# Patient Record
Sex: FEMALE | Race: WHITE | Employment: OTHER | URBAN - METROPOLITAN AREA
[De-identification: names, ages, dates, MRNs, and addresses within clinical notes are randomized per-mention and may not be internally consistent; named-entity substitution may affect disease eponyms.]

---

## 2017-01-09 ENCOUNTER — ANTI-COAG VISIT (OUTPATIENT)
Dept: INTERNAL MEDICINE CLINIC | Facility: CLINIC | Age: 82
End: 2017-01-09

## 2017-01-09 DIAGNOSIS — I48.20 CHRONIC ATRIAL FIBRILLATION (HCC): ICD-10-CM

## 2017-01-09 DIAGNOSIS — Z79.01 LONG TERM (CURRENT) USE OF ANTICOAGULANTS: ICD-10-CM

## 2017-01-09 DIAGNOSIS — I48.91 ATRIAL FIBRILLATION, UNSPECIFIED TYPE (HCC): Primary | ICD-10-CM

## 2017-01-09 LAB — INR: 2.2 (ref 2–3)

## 2017-01-23 ENCOUNTER — ANTI-COAG VISIT (OUTPATIENT)
Dept: INTERNAL MEDICINE CLINIC | Facility: CLINIC | Age: 82
End: 2017-01-23

## 2017-01-23 DIAGNOSIS — I48.20 CHRONIC ATRIAL FIBRILLATION (HCC): ICD-10-CM

## 2017-01-23 DIAGNOSIS — Z79.01 LONG TERM (CURRENT) USE OF ANTICOAGULANTS: ICD-10-CM

## 2017-01-23 DIAGNOSIS — I48.91 ATRIAL FIBRILLATION, UNSPECIFIED TYPE (HCC): Primary | ICD-10-CM

## 2017-01-23 LAB — INR: 2.1 (ref 2–3)

## 2017-02-06 ENCOUNTER — ANTI-COAG VISIT (OUTPATIENT)
Dept: INTERNAL MEDICINE CLINIC | Facility: CLINIC | Age: 82
End: 2017-02-06

## 2017-02-06 DIAGNOSIS — I48.91 ATRIAL FIBRILLATION, UNSPECIFIED TYPE (HCC): Primary | ICD-10-CM

## 2017-02-06 DIAGNOSIS — Z79.01 LONG TERM (CURRENT) USE OF ANTICOAGULANTS: ICD-10-CM

## 2017-02-06 DIAGNOSIS — I48.20 CHRONIC ATRIAL FIBRILLATION (HCC): ICD-10-CM

## 2017-02-06 LAB — INR: 2 (ref 2–3)

## 2017-02-21 ENCOUNTER — ANTI-COAG VISIT (OUTPATIENT)
Dept: INTERNAL MEDICINE CLINIC | Facility: CLINIC | Age: 82
End: 2017-02-21

## 2017-02-21 ENCOUNTER — TELEPHONE (OUTPATIENT)
Dept: INTERNAL MEDICINE CLINIC | Facility: CLINIC | Age: 82
End: 2017-02-21

## 2017-02-21 DIAGNOSIS — I48.20 CHRONIC ATRIAL FIBRILLATION (HCC): Primary | ICD-10-CM

## 2017-02-21 DIAGNOSIS — Z79.01 LONG TERM (CURRENT) USE OF ANTICOAGULANTS: ICD-10-CM

## 2017-02-21 DIAGNOSIS — I48.91 ATRIAL FIBRILLATION, UNSPECIFIED TYPE (HCC): Primary | ICD-10-CM

## 2017-02-21 DIAGNOSIS — I48.20 CHRONIC ATRIAL FIBRILLATION (HCC): ICD-10-CM

## 2017-02-21 LAB — INR: 1.9 (ref 2–3)

## 2017-02-22 ENCOUNTER — ANTI-COAG VISIT (OUTPATIENT)
Dept: INTERNAL MEDICINE CLINIC | Facility: CLINIC | Age: 82
End: 2017-02-22

## 2017-02-22 ENCOUNTER — TELEPHONE (OUTPATIENT)
Dept: INTERNAL MEDICINE CLINIC | Facility: CLINIC | Age: 82
End: 2017-02-22

## 2017-02-22 DIAGNOSIS — I48.91 ATRIAL FIBRILLATION, UNSPECIFIED TYPE (HCC): ICD-10-CM

## 2017-02-22 DIAGNOSIS — Z79.01 LONG TERM (CURRENT) USE OF ANTICOAGULANTS: ICD-10-CM

## 2017-02-22 DIAGNOSIS — I48.20 CHRONIC ATRIAL FIBRILLATION (HCC): Primary | ICD-10-CM

## 2017-03-07 ENCOUNTER — TELEPHONE (OUTPATIENT)
Dept: CARDIOLOGY CLINIC | Facility: CLINIC | Age: 82
End: 2017-03-07

## 2017-03-07 RX ORDER — DILTIAZEM HYDROCHLORIDE 120 MG/1
120 CAPSULE, COATED, EXTENDED RELEASE ORAL
Qty: 30 CAPSULE | Refills: 1 | Status: SHIPPED | OUTPATIENT
Start: 2017-03-07 | End: 2017-03-08

## 2017-03-08 LAB — INR: 1.9 (ref 2–3)

## 2017-03-08 RX ORDER — DILTIAZEM HYDROCHLORIDE 120 MG/1
120 CAPSULE, COATED, EXTENDED RELEASE ORAL
Qty: 90 CAPSULE | Refills: 0 | Status: SHIPPED | OUTPATIENT
Start: 2017-03-08 | End: 2017-05-30

## 2017-03-10 ENCOUNTER — ANTI-COAG VISIT (OUTPATIENT)
Dept: INTERNAL MEDICINE CLINIC | Facility: CLINIC | Age: 82
End: 2017-03-10

## 2017-03-10 DIAGNOSIS — Z79.01 LONG TERM (CURRENT) USE OF ANTICOAGULANTS: ICD-10-CM

## 2017-03-10 DIAGNOSIS — I48.91 ATRIAL FIBRILLATION, UNSPECIFIED TYPE (HCC): ICD-10-CM

## 2017-03-10 DIAGNOSIS — I48.20 CHRONIC ATRIAL FIBRILLATION (HCC): Primary | ICD-10-CM

## 2017-03-20 ENCOUNTER — ANTI-COAG VISIT (OUTPATIENT)
Dept: INTERNAL MEDICINE CLINIC | Facility: CLINIC | Age: 82
End: 2017-03-20

## 2017-03-20 DIAGNOSIS — I48.20 CHRONIC ATRIAL FIBRILLATION (HCC): Primary | ICD-10-CM

## 2017-03-20 DIAGNOSIS — Z79.01 LONG TERM (CURRENT) USE OF ANTICOAGULANTS: ICD-10-CM

## 2017-03-20 DIAGNOSIS — I48.91 ATRIAL FIBRILLATION, UNSPECIFIED TYPE (HCC): ICD-10-CM

## 2017-03-20 LAB — INR: 1.8 (ref 2–3)

## 2017-03-21 ENCOUNTER — PRIOR ORIGINAL RECORDS (OUTPATIENT)
Dept: OTHER | Age: 82
End: 2017-03-21

## 2017-03-30 ENCOUNTER — ANTI-COAG VISIT (OUTPATIENT)
Dept: INTERNAL MEDICINE CLINIC | Facility: CLINIC | Age: 82
End: 2017-03-30

## 2017-03-30 DIAGNOSIS — Z79.01 LONG TERM (CURRENT) USE OF ANTICOAGULANTS: ICD-10-CM

## 2017-03-30 DIAGNOSIS — I48.91 ATRIAL FIBRILLATION, UNSPECIFIED TYPE (HCC): ICD-10-CM

## 2017-03-30 DIAGNOSIS — I48.20 CHRONIC ATRIAL FIBRILLATION (HCC): Primary | ICD-10-CM

## 2017-03-30 LAB — INR: 2.2 (ref 2–3)

## 2017-04-17 ENCOUNTER — ANTI-COAG VISIT (OUTPATIENT)
Dept: INTERNAL MEDICINE CLINIC | Facility: CLINIC | Age: 82
End: 2017-04-17

## 2017-04-17 DIAGNOSIS — I48.91 ATRIAL FIBRILLATION, UNSPECIFIED TYPE (HCC): ICD-10-CM

## 2017-04-17 DIAGNOSIS — Z79.01 LONG TERM (CURRENT) USE OF ANTICOAGULANTS: ICD-10-CM

## 2017-04-17 DIAGNOSIS — I48.20 CHRONIC ATRIAL FIBRILLATION (HCC): Primary | ICD-10-CM

## 2017-04-19 ENCOUNTER — TELEPHONE (OUTPATIENT)
Dept: CARDIOLOGY CLINIC | Facility: CLINIC | Age: 82
End: 2017-04-19

## 2017-04-19 RX ORDER — WARFARIN SODIUM 1 MG/1
1 TABLET ORAL EVERY EVENING
Qty: 90 TABLET | Refills: 0 | Status: SHIPPED | OUTPATIENT
Start: 2017-04-19 | End: 2017-08-04 | Stop reason: SDUPTHER

## 2017-04-19 NOTE — TELEPHONE ENCOUNTER
Warfarin Sod 1mg tab (pink), take 1 tab (1mg) by mouth daily as directed by the CC. Per [facility] \"pt is requesting a 90 day supply. Current rx only has enough for 30 days\".      Current outpatient prescriptions:   •  Warfarin Sodium 1 MG Oral Tab, Take

## 2017-04-24 ENCOUNTER — ANTI-COAG VISIT (OUTPATIENT)
Dept: INTERNAL MEDICINE CLINIC | Facility: CLINIC | Age: 82
End: 2017-04-24

## 2017-04-24 DIAGNOSIS — I48.20 CHRONIC ATRIAL FIBRILLATION (HCC): Primary | ICD-10-CM

## 2017-04-24 DIAGNOSIS — Z79.01 LONG TERM (CURRENT) USE OF ANTICOAGULANTS: ICD-10-CM

## 2017-04-24 DIAGNOSIS — I48.91 ATRIAL FIBRILLATION, UNSPECIFIED TYPE (HCC): ICD-10-CM

## 2017-05-08 ENCOUNTER — ANTI-COAG VISIT (OUTPATIENT)
Dept: INTERNAL MEDICINE CLINIC | Facility: CLINIC | Age: 82
End: 2017-05-08

## 2017-05-08 DIAGNOSIS — I48.20 CHRONIC ATRIAL FIBRILLATION (HCC): Primary | ICD-10-CM

## 2017-05-08 DIAGNOSIS — Z79.01 LONG TERM (CURRENT) USE OF ANTICOAGULANTS: ICD-10-CM

## 2017-05-08 DIAGNOSIS — I48.91 ATRIAL FIBRILLATION, UNSPECIFIED TYPE (HCC): ICD-10-CM

## 2017-05-22 ENCOUNTER — ANTI-COAG VISIT (OUTPATIENT)
Dept: INTERNAL MEDICINE CLINIC | Facility: CLINIC | Age: 82
End: 2017-05-22

## 2017-05-22 DIAGNOSIS — Z79.01 LONG TERM (CURRENT) USE OF ANTICOAGULANTS: ICD-10-CM

## 2017-05-22 DIAGNOSIS — I48.91 ATRIAL FIBRILLATION, UNSPECIFIED TYPE (HCC): ICD-10-CM

## 2017-05-22 DIAGNOSIS — I48.20 CHRONIC ATRIAL FIBRILLATION (HCC): Primary | ICD-10-CM

## 2017-05-30 ENCOUNTER — TELEPHONE (OUTPATIENT)
Dept: CARDIOLOGY CLINIC | Facility: CLINIC | Age: 82
End: 2017-05-30

## 2017-05-30 RX ORDER — DILTIAZEM HYDROCHLORIDE 120 MG/1
120 CAPSULE, COATED, EXTENDED RELEASE ORAL
Qty: 30 CAPSULE | Refills: 0 | Status: SHIPPED | OUTPATIENT
Start: 2017-05-30 | End: 2017-06-22

## 2017-05-30 NOTE — TELEPHONE ENCOUNTER
Cartia (Diltiazem) 120MG XT caps, take 1 capsule (120mg) by mouth every day, qty 90    Current outpatient prescriptions:   •  DilTIAZem HCl ER Coated Beads (CARTIA XT) 120 MG Oral Capsule SR 24 Hr, Take 1 capsule (120 mg total) by mouth once daily. , Disp:

## 2017-05-30 NOTE — TELEPHONE ENCOUNTER
Received fax from [facility] requesting 90 day supply of Cartia (Diltiazem) 120Mmg XT caps

## 2017-05-30 NOTE — TELEPHONE ENCOUNTER
Called [facility] pharmacy on file, denying 90 day supply of medication, only authorizing 30 days.

## 2017-06-06 ENCOUNTER — ANTI-COAG VISIT (OUTPATIENT)
Dept: INTERNAL MEDICINE CLINIC | Facility: CLINIC | Age: 82
End: 2017-06-06

## 2017-06-06 DIAGNOSIS — I48.91 ATRIAL FIBRILLATION, UNSPECIFIED TYPE (HCC): ICD-10-CM

## 2017-06-06 DIAGNOSIS — Z79.01 LONG TERM (CURRENT) USE OF ANTICOAGULANTS: ICD-10-CM

## 2017-06-06 DIAGNOSIS — I48.20 CHRONIC ATRIAL FIBRILLATION (HCC): Primary | ICD-10-CM

## 2017-06-22 ENCOUNTER — TELEPHONE (OUTPATIENT)
Dept: CARDIOLOGY CLINIC | Facility: CLINIC | Age: 82
End: 2017-06-22

## 2017-06-22 ENCOUNTER — ANTI-COAG VISIT (OUTPATIENT)
Dept: INTERNAL MEDICINE CLINIC | Facility: CLINIC | Age: 82
End: 2017-06-22

## 2017-06-22 DIAGNOSIS — I48.20 CHRONIC ATRIAL FIBRILLATION (HCC): Primary | ICD-10-CM

## 2017-06-22 DIAGNOSIS — I48.91 ATRIAL FIBRILLATION, UNSPECIFIED TYPE (HCC): ICD-10-CM

## 2017-06-22 DIAGNOSIS — Z79.01 LONG TERM (CURRENT) USE OF ANTICOAGULANTS: ICD-10-CM

## 2017-06-22 RX ORDER — DILTIAZEM HYDROCHLORIDE 120 MG/1
120 CAPSULE, COATED, EXTENDED RELEASE ORAL
Qty: 30 CAPSULE | Refills: 0 | Status: SHIPPED | OUTPATIENT
Start: 2017-06-22 | End: 2017-08-04

## 2017-06-24 ENCOUNTER — PRIOR ORIGINAL RECORDS (OUTPATIENT)
Dept: OTHER | Age: 82
End: 2017-06-24

## 2017-06-24 ENCOUNTER — LAB ENCOUNTER (OUTPATIENT)
Dept: LAB | Facility: HOSPITAL | Age: 82
End: 2017-06-24
Attending: INTERNAL MEDICINE
Payer: MEDICARE

## 2017-06-24 DIAGNOSIS — I48.91 ATRIAL FIBRILLATION, UNSPECIFIED TYPE (HCC): ICD-10-CM

## 2017-06-24 DIAGNOSIS — I42.9 PRIMARY CARDIOMYOPATHY (HCC): ICD-10-CM

## 2017-06-24 DIAGNOSIS — I10 HYPERTENSION, BENIGN: ICD-10-CM

## 2017-06-24 DIAGNOSIS — I48.91 ATRIAL FIBRILLATION (HCC): Primary | ICD-10-CM

## 2017-06-24 DIAGNOSIS — I48.20 CHRONIC ATRIAL FIBRILLATION (HCC): ICD-10-CM

## 2017-06-24 DIAGNOSIS — Z79.01 LONG TERM (CURRENT) USE OF ANTICOAGULANTS: ICD-10-CM

## 2017-06-24 PROCEDURE — 84450 TRANSFERASE (AST) (SGOT): CPT

## 2017-06-24 PROCEDURE — 36415 COLL VENOUS BLD VENIPUNCTURE: CPT

## 2017-06-24 PROCEDURE — 84460 ALANINE AMINO (ALT) (SGPT): CPT

## 2017-06-24 PROCEDURE — 85610 PROTHROMBIN TIME: CPT

## 2017-06-24 PROCEDURE — 84443 ASSAY THYROID STIM HORMONE: CPT

## 2017-06-25 NOTE — PROGRESS NOTES
Your Prothrombin time/INR  is in therapeutic range. Continue coumadin dosing as directed by the coumadin clinic.

## 2017-06-26 ENCOUNTER — ANTI-COAG VISIT (OUTPATIENT)
Dept: INTERNAL MEDICINE CLINIC | Facility: CLINIC | Age: 82
End: 2017-06-26

## 2017-06-26 DIAGNOSIS — I48.91 ATRIAL FIBRILLATION, UNSPECIFIED TYPE (HCC): ICD-10-CM

## 2017-06-26 DIAGNOSIS — Z79.01 LONG TERM (CURRENT) USE OF ANTICOAGULANTS: ICD-10-CM

## 2017-06-26 DIAGNOSIS — I48.20 CHRONIC ATRIAL FIBRILLATION (HCC): ICD-10-CM

## 2017-06-26 LAB — THYROID STIMULATING HORMONE: 5.38 MLU/L

## 2017-06-27 ENCOUNTER — PRIOR ORIGINAL RECORDS (OUTPATIENT)
Dept: OTHER | Age: 82
End: 2017-06-27

## 2017-06-27 LAB
SGOT (AST): 36 IU/L
SGPT (ALT): 22 IU/L

## 2017-07-11 LAB — INR: 2.7 (ref 2–3)

## 2017-07-13 ENCOUNTER — ANTI-COAG VISIT (OUTPATIENT)
Dept: INTERNAL MEDICINE CLINIC | Facility: CLINIC | Age: 82
End: 2017-07-13

## 2017-07-13 DIAGNOSIS — I48.91 ATRIAL FIBRILLATION, UNSPECIFIED TYPE (HCC): ICD-10-CM

## 2017-07-13 DIAGNOSIS — Z79.01 LONG TERM (CURRENT) USE OF ANTICOAGULANTS: ICD-10-CM

## 2017-07-13 DIAGNOSIS — I48.20 CHRONIC ATRIAL FIBRILLATION (HCC): ICD-10-CM

## 2017-07-24 ENCOUNTER — ANTI-COAG VISIT (OUTPATIENT)
Dept: INTERNAL MEDICINE CLINIC | Facility: CLINIC | Age: 82
End: 2017-07-24

## 2017-07-24 DIAGNOSIS — Z79.01 LONG TERM (CURRENT) USE OF ANTICOAGULANTS: ICD-10-CM

## 2017-07-24 DIAGNOSIS — I48.91 ATRIAL FIBRILLATION, UNSPECIFIED TYPE (HCC): ICD-10-CM

## 2017-07-24 DIAGNOSIS — I48.20 CHRONIC ATRIAL FIBRILLATION (HCC): ICD-10-CM

## 2017-07-24 LAB — INR: 2.1 (ref 2–3)

## 2017-07-25 ENCOUNTER — OFFICE VISIT (OUTPATIENT)
Dept: CARDIOLOGY CLINIC | Facility: CLINIC | Age: 82
End: 2017-07-25

## 2017-07-25 VITALS
SYSTOLIC BLOOD PRESSURE: 126 MMHG | WEIGHT: 170 LBS | RESPIRATION RATE: 20 BRPM | BODY MASS INDEX: 28 KG/M2 | DIASTOLIC BLOOD PRESSURE: 70 MMHG | HEART RATE: 76 BPM

## 2017-07-25 DIAGNOSIS — I42.9 PRIMARY CARDIOMYOPATHY (HCC): ICD-10-CM

## 2017-07-25 DIAGNOSIS — I10 HYPERTENSION, BENIGN: ICD-10-CM

## 2017-07-25 DIAGNOSIS — I48.20 CHRONIC ATRIAL FIBRILLATION (HCC): Primary | ICD-10-CM

## 2017-07-25 PROCEDURE — 99214 OFFICE O/P EST MOD 30 MIN: CPT | Performed by: INTERNAL MEDICINE

## 2017-07-25 PROCEDURE — G0463 HOSPITAL OUTPT CLINIC VISIT: HCPCS | Performed by: INTERNAL MEDICINE

## 2017-07-25 NOTE — PROGRESS NOTES
Tamar Ingram is a 80year old female. Patient presents with: Follow - Up: Both feet swollen    HPI:   This is a pleasant 80year-old with chronic atrial fibrillation elevated cholesterol hypertension and prior cardiomyopathy with some MR.   She presents medical history on file.    Social History:  Smoking status: Never Smoker                                                                   REVIEW OF SYSTEMS:   GENERAL HEALTH: feels well otherwise  SKIN: denies any unusual skin lesions or rashes  RESPIRATO

## 2017-07-25 NOTE — PATIENT INSTRUCTIONS
Continue same medicines and call if leg swelling worsens  To help with leg swelling avoid salt keep feet up when sitting for prolonged periods of time and if it persists consider adding support stockings  Continue Coumadin clinic follow-up  Should have blo

## 2017-08-04 ENCOUNTER — TELEPHONE (OUTPATIENT)
Dept: CARDIOLOGY CLINIC | Facility: CLINIC | Age: 82
End: 2017-08-04

## 2017-08-04 ENCOUNTER — ANTI-COAG VISIT (OUTPATIENT)
Dept: INTERNAL MEDICINE CLINIC | Facility: CLINIC | Age: 82
End: 2017-08-04

## 2017-08-04 DIAGNOSIS — I48.20 CHRONIC ATRIAL FIBRILLATION (HCC): ICD-10-CM

## 2017-08-04 DIAGNOSIS — I48.91 ATRIAL FIBRILLATION, UNSPECIFIED TYPE (HCC): ICD-10-CM

## 2017-08-04 DIAGNOSIS — Z79.01 LONG TERM (CURRENT) USE OF ANTICOAGULANTS: ICD-10-CM

## 2017-08-04 RX ORDER — DILTIAZEM HYDROCHLORIDE 120 MG/1
120 CAPSULE, COATED, EXTENDED RELEASE ORAL
Qty: 30 CAPSULE | Refills: 12 | Status: SHIPPED | OUTPATIENT
Start: 2017-08-04 | End: 2018-08-23

## 2017-08-04 RX ORDER — WARFARIN SODIUM 1 MG/1
TABLET ORAL
Qty: 90 TABLET | Refills: 1 | Status: SHIPPED | OUTPATIENT
Start: 2017-08-04 | End: 2018-01-23

## 2017-08-04 NOTE — TELEPHONE ENCOUNTER
Cartia (Diltiazem) 120MGXT caps, take 1 cap (120mg) by mouth every day, qty 30    Current Outpatient Prescriptions:   •  DilTIAZem HCl ER Coated Beads (CARTIA XT) 120 MG Oral Capsule SR 24 Hr, Take 1 capsule (120 mg total) by mouth once daily. , Disp: 30 ca

## 2017-08-04 NOTE — TELEPHONE ENCOUNTER
Warfarin Sod 1mg tab (pink), take 1 tab (1mg) by mouth every evening, qty 90    Current Outpatient Prescriptions:   •  Warfarin Sodium 1 MG Oral Tab, Take 1 tablet (1 mg total) by mouth every evening., Disp: 90 tablet, Rfl: 0

## 2017-08-07 LAB — INR: 2.3 (ref 2–3)

## 2017-08-09 ENCOUNTER — ANTI-COAG VISIT (OUTPATIENT)
Dept: INTERNAL MEDICINE CLINIC | Facility: CLINIC | Age: 82
End: 2017-08-09

## 2017-08-09 DIAGNOSIS — I48.91 ATRIAL FIBRILLATION, UNSPECIFIED TYPE (HCC): ICD-10-CM

## 2017-08-09 DIAGNOSIS — Z79.01 LONG TERM (CURRENT) USE OF ANTICOAGULANTS: ICD-10-CM

## 2017-08-09 DIAGNOSIS — I48.20 CHRONIC ATRIAL FIBRILLATION (HCC): ICD-10-CM

## 2017-08-22 LAB — INR: 2.2 (ref 2–3)

## 2017-08-23 ENCOUNTER — ANTI-COAG VISIT (OUTPATIENT)
Dept: INTERNAL MEDICINE CLINIC | Facility: CLINIC | Age: 82
End: 2017-08-23

## 2017-08-23 DIAGNOSIS — I48.20 CHRONIC ATRIAL FIBRILLATION (HCC): ICD-10-CM

## 2017-08-23 DIAGNOSIS — I48.91 ATRIAL FIBRILLATION, UNSPECIFIED TYPE (HCC): ICD-10-CM

## 2017-09-04 LAB — INR: 2.2 (ref 2–3)

## 2017-09-08 ENCOUNTER — ANTI-COAG VISIT (OUTPATIENT)
Dept: INTERNAL MEDICINE CLINIC | Facility: CLINIC | Age: 82
End: 2017-09-08

## 2017-09-08 DIAGNOSIS — I48.91 ATRIAL FIBRILLATION, UNSPECIFIED TYPE (HCC): ICD-10-CM

## 2017-09-08 DIAGNOSIS — I48.20 CHRONIC ATRIAL FIBRILLATION (HCC): ICD-10-CM

## 2017-09-08 DIAGNOSIS — I48.0 PAROXYSMAL ATRIAL FIBRILLATION (HCC): ICD-10-CM

## 2017-09-18 ENCOUNTER — ANTI-COAG VISIT (OUTPATIENT)
Dept: INTERNAL MEDICINE CLINIC | Facility: CLINIC | Age: 82
End: 2017-09-18

## 2017-09-18 DIAGNOSIS — I48.91 ATRIAL FIBRILLATION, UNSPECIFIED TYPE (HCC): ICD-10-CM

## 2017-09-18 DIAGNOSIS — I48.0 PAROXYSMAL ATRIAL FIBRILLATION (HCC): ICD-10-CM

## 2017-09-18 DIAGNOSIS — I48.20 CHRONIC ATRIAL FIBRILLATION (HCC): ICD-10-CM

## 2017-09-18 LAB — INR: 2.2 (ref 2–3)

## 2017-10-02 ENCOUNTER — ANTI-COAG VISIT (OUTPATIENT)
Dept: INTERNAL MEDICINE CLINIC | Facility: CLINIC | Age: 82
End: 2017-10-02

## 2017-10-02 DIAGNOSIS — I48.20 CHRONIC ATRIAL FIBRILLATION (HCC): ICD-10-CM

## 2017-10-02 DIAGNOSIS — I48.91 ATRIAL FIBRILLATION, UNSPECIFIED TYPE (HCC): ICD-10-CM

## 2017-10-02 DIAGNOSIS — I48.0 PAROXYSMAL ATRIAL FIBRILLATION (HCC): ICD-10-CM

## 2017-10-18 ENCOUNTER — ANTI-COAG VISIT (OUTPATIENT)
Dept: INTERNAL MEDICINE CLINIC | Facility: CLINIC | Age: 82
End: 2017-10-18

## 2017-10-18 DIAGNOSIS — I48.20 CHRONIC ATRIAL FIBRILLATION (HCC): ICD-10-CM

## 2017-10-18 DIAGNOSIS — I48.91 ATRIAL FIBRILLATION, UNSPECIFIED TYPE (HCC): ICD-10-CM

## 2017-11-02 ENCOUNTER — ANTI-COAG VISIT (OUTPATIENT)
Dept: INTERNAL MEDICINE CLINIC | Facility: CLINIC | Age: 82
End: 2017-11-02

## 2017-11-02 DIAGNOSIS — I48.20 CHRONIC ATRIAL FIBRILLATION (HCC): ICD-10-CM

## 2017-11-02 DIAGNOSIS — I48.91 ATRIAL FIBRILLATION, UNSPECIFIED TYPE (HCC): ICD-10-CM

## 2017-11-13 ENCOUNTER — ANTI-COAG VISIT (OUTPATIENT)
Dept: INTERNAL MEDICINE CLINIC | Facility: CLINIC | Age: 82
End: 2017-11-13

## 2017-11-13 DIAGNOSIS — I48.91 ATRIAL FIBRILLATION, UNSPECIFIED TYPE (HCC): ICD-10-CM

## 2017-11-13 DIAGNOSIS — Z79.01 LONG TERM (CURRENT) USE OF ANTICOAGULANTS: ICD-10-CM

## 2017-11-13 DIAGNOSIS — I48.20 CHRONIC ATRIAL FIBRILLATION (HCC): ICD-10-CM

## 2017-11-24 ENCOUNTER — ANTI-COAG VISIT (OUTPATIENT)
Dept: INTERNAL MEDICINE CLINIC | Facility: CLINIC | Age: 82
End: 2017-11-24

## 2017-11-24 DIAGNOSIS — I48.91 ATRIAL FIBRILLATION, UNSPECIFIED TYPE (HCC): ICD-10-CM

## 2017-11-24 DIAGNOSIS — Z79.01 LONG TERM (CURRENT) USE OF ANTICOAGULANTS: ICD-10-CM

## 2017-11-24 DIAGNOSIS — I48.20 CHRONIC ATRIAL FIBRILLATION (HCC): ICD-10-CM

## 2017-12-05 ENCOUNTER — TELEPHONE (OUTPATIENT)
Dept: OPHTHALMOLOGY | Facility: CLINIC | Age: 82
End: 2017-12-05

## 2017-12-05 NOTE — TELEPHONE ENCOUNTER
pts son called. He apologized for missing todays appt with DENISSE. He reschedule for next available, 2/27/18.

## 2017-12-13 ENCOUNTER — ANTI-COAG VISIT (OUTPATIENT)
Dept: INTERNAL MEDICINE CLINIC | Facility: CLINIC | Age: 82
End: 2017-12-13

## 2017-12-13 DIAGNOSIS — Z79.01 LONG TERM (CURRENT) USE OF ANTICOAGULANTS: ICD-10-CM

## 2017-12-13 DIAGNOSIS — I48.20 CHRONIC ATRIAL FIBRILLATION (HCC): ICD-10-CM

## 2017-12-13 DIAGNOSIS — I48.91 ATRIAL FIBRILLATION, UNSPECIFIED TYPE (HCC): ICD-10-CM

## 2018-01-02 LAB — INR: 3.1 (ref 2–3)

## 2018-01-05 ENCOUNTER — ANTI-COAG VISIT (OUTPATIENT)
Dept: INTERNAL MEDICINE CLINIC | Facility: CLINIC | Age: 83
End: 2018-01-05

## 2018-01-05 DIAGNOSIS — I48.91 ATRIAL FIBRILLATION, UNSPECIFIED TYPE (HCC): ICD-10-CM

## 2018-01-05 DIAGNOSIS — I48.20 CHRONIC ATRIAL FIBRILLATION (HCC): ICD-10-CM

## 2018-01-05 DIAGNOSIS — Z79.01 LONG TERM (CURRENT) USE OF ANTICOAGULANTS: ICD-10-CM

## 2018-01-05 DIAGNOSIS — I48.0 PAROXYSMAL ATRIAL FIBRILLATION (HCC): ICD-10-CM

## 2018-01-16 ENCOUNTER — ANTI-COAG VISIT (OUTPATIENT)
Dept: INTERNAL MEDICINE CLINIC | Facility: CLINIC | Age: 83
End: 2018-01-16

## 2018-01-16 DIAGNOSIS — I48.20 CHRONIC ATRIAL FIBRILLATION (HCC): ICD-10-CM

## 2018-01-16 DIAGNOSIS — I48.91 ATRIAL FIBRILLATION, UNSPECIFIED TYPE (HCC): ICD-10-CM

## 2018-01-16 DIAGNOSIS — Z79.01 LONG TERM (CURRENT) USE OF ANTICOAGULANTS: ICD-10-CM

## 2018-01-16 LAB — INR: 2.7 (ref 2–3)

## 2018-01-23 ENCOUNTER — TELEPHONE (OUTPATIENT)
Dept: CARDIOLOGY CLINIC | Facility: CLINIC | Age: 83
End: 2018-01-23

## 2018-01-23 RX ORDER — WARFARIN SODIUM 1 MG/1
TABLET ORAL
Qty: 90 TABLET | Refills: 0 | Status: ON HOLD | OUTPATIENT
Start: 2018-01-23 | End: 2020-01-01

## 2018-01-23 NOTE — TELEPHONE ENCOUNTER
Pts son calling to f/up on getting refill for Warfarin. He states that the pt. Is almost out of her medication. Pt. Was last seen on 7/25/17, and is supposed to come back in March for f/up.

## 2018-01-29 ENCOUNTER — ANTI-COAG VISIT (OUTPATIENT)
Dept: INTERNAL MEDICINE CLINIC | Facility: CLINIC | Age: 83
End: 2018-01-29

## 2018-01-29 DIAGNOSIS — I48.91 ATRIAL FIBRILLATION, UNSPECIFIED TYPE (HCC): ICD-10-CM

## 2018-01-29 DIAGNOSIS — Z79.01 LONG TERM (CURRENT) USE OF ANTICOAGULANTS: ICD-10-CM

## 2018-01-29 DIAGNOSIS — I48.0 PAROXYSMAL ATRIAL FIBRILLATION (HCC): ICD-10-CM

## 2018-01-29 DIAGNOSIS — I48.20 CHRONIC ATRIAL FIBRILLATION (HCC): ICD-10-CM

## 2018-01-29 LAB — INR: 3 (ref 2–3)

## 2018-02-19 LAB — INR: 2.9 (ref 2–3)

## 2018-02-20 ENCOUNTER — ANTI-COAG VISIT (OUTPATIENT)
Dept: INTERNAL MEDICINE CLINIC | Facility: CLINIC | Age: 83
End: 2018-02-20

## 2018-02-20 DIAGNOSIS — I48.20 CHRONIC ATRIAL FIBRILLATION (HCC): ICD-10-CM

## 2018-02-20 DIAGNOSIS — I48.91 ATRIAL FIBRILLATION, UNSPECIFIED TYPE (HCC): ICD-10-CM

## 2018-02-20 DIAGNOSIS — Z79.01 LONG TERM (CURRENT) USE OF ANTICOAGULANTS: ICD-10-CM

## 2018-02-27 ENCOUNTER — OFFICE VISIT (OUTPATIENT)
Dept: OPHTHALMOLOGY | Facility: CLINIC | Age: 83
End: 2018-02-27

## 2018-02-27 DIAGNOSIS — Z96.1 PSEUDOPHAKIA OF BOTH EYES: ICD-10-CM

## 2018-02-27 DIAGNOSIS — H43.391 FLOATER, VITREOUS, RIGHT: ICD-10-CM

## 2018-02-27 DIAGNOSIS — H35.3130 BILATERAL NONEXUDATIVE AGE-RELATED MACULAR DEGENERATION, UNSPECIFIED STAGE: Primary | ICD-10-CM

## 2018-02-27 PROCEDURE — 92014 COMPRE OPH EXAM EST PT 1/>: CPT | Performed by: OPHTHALMOLOGY

## 2018-02-27 RX ORDER — CARVEDILOL 12.5 MG/1
12.5 TABLET ORAL 2 TIMES DAILY WITH MEALS
COMMUNITY
Start: 2018-02-12

## 2018-02-27 RX ORDER — LEVOTHYROXINE SODIUM 137 UG/1
TABLET ORAL
Refills: 2 | COMMUNITY
Start: 2017-12-14

## 2018-02-27 NOTE — PATIENT INSTRUCTIONS
Age-related macular degeneration, dry  Discussed early macular degeneration changes in both eyes with the patient. Stressed importance of taking either daily multi-vitamins or over the counter eye vitamins. (any brand is okay).   Recommend eating a health

## 2018-03-05 ENCOUNTER — PRIOR ORIGINAL RECORDS (OUTPATIENT)
Dept: OTHER | Age: 83
End: 2018-03-05

## 2018-03-05 ENCOUNTER — ANTI-COAG VISIT (OUTPATIENT)
Dept: INTERNAL MEDICINE CLINIC | Facility: CLINIC | Age: 83
End: 2018-03-05

## 2018-03-05 DIAGNOSIS — I48.20 CHRONIC ATRIAL FIBRILLATION (HCC): ICD-10-CM

## 2018-03-05 DIAGNOSIS — I48.91 ATRIAL FIBRILLATION, UNSPECIFIED TYPE (HCC): ICD-10-CM

## 2018-03-05 DIAGNOSIS — Z79.01 LONG TERM (CURRENT) USE OF ANTICOAGULANTS: ICD-10-CM

## 2018-03-05 LAB — INR: 2.5 (ref 2–3)

## 2018-03-13 ENCOUNTER — TELEPHONE (OUTPATIENT)
Dept: INTERNAL MEDICINE CLINIC | Facility: CLINIC | Age: 83
End: 2018-03-13

## 2018-03-13 ENCOUNTER — TELEPHONE (OUTPATIENT)
Dept: CARDIOLOGY CLINIC | Facility: CLINIC | Age: 83
End: 2018-03-13

## 2018-03-13 ENCOUNTER — ANTI-COAG VISIT (OUTPATIENT)
Dept: INTERNAL MEDICINE CLINIC | Facility: CLINIC | Age: 83
End: 2018-03-13

## 2018-03-13 DIAGNOSIS — Z79.01 LONG TERM (CURRENT) USE OF ANTICOAGULANTS: ICD-10-CM

## 2018-03-13 DIAGNOSIS — I48.20 CHRONIC ATRIAL FIBRILLATION (HCC): ICD-10-CM

## 2018-03-13 DIAGNOSIS — I48.91 ATRIAL FIBRILLATION, UNSPECIFIED TYPE (HCC): ICD-10-CM

## 2018-03-13 DIAGNOSIS — I48.91 ATRIAL FIBRILLATION, UNSPECIFIED TYPE (HCC): Primary | ICD-10-CM

## 2018-03-13 NOTE — TELEPHONE ENCOUNTER
Left message for patient regarding overdue INR check. Current JEFF form . Please renew JEFF form for this year.

## 2018-03-13 NOTE — TELEPHONE ENCOUNTER
Pt's son calling to make appt for Pt's INR testing. Son states that Pt has an appt on 3/22 and was hoping to have it the same day, either before or after her appt with Dr. Mayra Hollins. Son states a message can be left if needed. Please advise.

## 2018-03-13 NOTE — TELEPHONE ENCOUNTER
Patient uses [facility]. Patient due for an INR recheck on 3/19/18. Current anticoagulation monitoring order with [facility]  on 18. Please sign pending order for renewal.     Thank you.

## 2018-03-13 NOTE — TELEPHONE ENCOUNTER
Spoke to patient's son Leticia Fish. Patient does [facility]. She is due for 2 week INR check next Monday. Results will be faxed to the Coumadin Clinic. Patient has upcoming f/u with Dr. Rossy Wright. Will have patient renew JEFF form at time of appointment.

## 2018-03-13 NOTE — TELEPHONE ENCOUNTER
Anticoagulation monitoring order approved by SIL Do. PT/INR standing order completed and anticoagulation episode info updated by Stewart Gregory RN.

## 2018-03-20 ENCOUNTER — ANTI-COAG VISIT (OUTPATIENT)
Dept: INTERNAL MEDICINE CLINIC | Facility: CLINIC | Age: 83
End: 2018-03-20

## 2018-03-20 ENCOUNTER — PRIOR ORIGINAL RECORDS (OUTPATIENT)
Dept: OTHER | Age: 83
End: 2018-03-20

## 2018-03-20 DIAGNOSIS — I48.20 CHRONIC ATRIAL FIBRILLATION (HCC): ICD-10-CM

## 2018-03-20 DIAGNOSIS — I48.91 ATRIAL FIBRILLATION, UNSPECIFIED TYPE (HCC): ICD-10-CM

## 2018-03-20 DIAGNOSIS — Z79.01 LONG TERM (CURRENT) USE OF ANTICOAGULANTS: ICD-10-CM

## 2018-03-20 LAB — INR: 2.2 (ref 0.8–1.2)

## 2018-03-22 ENCOUNTER — OFFICE VISIT (OUTPATIENT)
Dept: CARDIOLOGY CLINIC | Facility: CLINIC | Age: 83
End: 2018-03-22

## 2018-03-22 ENCOUNTER — TELEPHONE (OUTPATIENT)
Dept: INTERNAL MEDICINE CLINIC | Facility: CLINIC | Age: 83
End: 2018-03-22

## 2018-03-22 VITALS
RESPIRATION RATE: 12 BRPM | DIASTOLIC BLOOD PRESSURE: 80 MMHG | WEIGHT: 162 LBS | HEART RATE: 56 BPM | BODY MASS INDEX: 27 KG/M2 | OXYGEN SATURATION: 99 % | SYSTOLIC BLOOD PRESSURE: 128 MMHG

## 2018-03-22 DIAGNOSIS — I48.91 ATRIAL FIBRILLATION, UNSPECIFIED TYPE (HCC): Primary | ICD-10-CM

## 2018-03-22 DIAGNOSIS — I42.9 PRIMARY CARDIOMYOPATHY (HCC): ICD-10-CM

## 2018-03-22 DIAGNOSIS — I10 HYPERTENSION, BENIGN: ICD-10-CM

## 2018-03-22 PROCEDURE — G0463 HOSPITAL OUTPT CLINIC VISIT: HCPCS | Performed by: INTERNAL MEDICINE

## 2018-03-22 PROCEDURE — 99214 OFFICE O/P EST MOD 30 MIN: CPT | Performed by: INTERNAL MEDICINE

## 2018-03-22 NOTE — PROGRESS NOTES
Karishma Goodwin is a 80year old female. Patient presents with: Follow - Up    HPI:   This is a pleasant 59-year-old with chronic atrial fibrillation elevated cholesterol hypertension prior cardiomyopathy and MR who presents for follow-up.   She is presentl REVIEW OF SYSTEMS:   GENERAL HEALTH: feels well otherwise  SKIN: denies any unusual skin lesions or rashes  RESPIRATORY: denies shortness of breath with exertion  CARDIOVASCULAR:See HPI  GI: denies abdominal pain and denies heartburn  NEURO: denies hea

## 2018-03-22 NOTE — TELEPHONE ENCOUNTER
Pt here in clinic. Has an appt w/ Dr. Yvrose Perdue. Son wanted to inform our office that pt has filled out new JEFF form. Reviewed INR result and warfarin management per anticoagulation visit from 3/20/18. JEFF form is current and up to date.      No furt

## 2018-03-23 ENCOUNTER — TELEPHONE (OUTPATIENT)
Dept: CARDIOLOGY CLINIC | Facility: CLINIC | Age: 83
End: 2018-03-23

## 2018-03-23 NOTE — TELEPHONE ENCOUNTER
Spoke skylar Frank from [facility] was on hold for 14 min was then transferred to [facility] QuirinoLangley at [phone]. PA was initiated and approved. Auth # [id number]. Valid from 3/23/18 - 4/22/18. LMTCB, to inform pt of information above.

## 2018-03-23 NOTE — TELEPHONE ENCOUNTER
Spoke to Cornelius-patient son regarding prior Jodell  details he verbalized understanding. He also informed me that patient had CMP, Lipids, TSH done at [facility]. Results are in [facility] care everywhere, pls review and call son back.  Message routed to [facility]

## 2018-03-23 NOTE — TELEPHONE ENCOUNTER
Spoke to pts son (HIPPA verified) relayed [employer] as shown below. Son verbalized understanding of whole message and had no further questions at this time.

## 2018-03-23 NOTE — TELEPHONE ENCOUNTER
Labs from 3/5/18 reviewed.  Chem panel and lipids are stable, TSH is abnormal and needs to be addressed by PCP if not already

## 2018-03-30 ENCOUNTER — PRIOR ORIGINAL RECORDS (OUTPATIENT)
Dept: OTHER | Age: 83
End: 2018-03-30

## 2018-03-30 LAB
ALT (SGPT): 25 U/L
AST (SGOT): 40 U/L
BUN: 23 MG/DL
CHOLESTEROL, TOTAL: 153 MG/DL
CREATININE, SERUM: 1.34 MG/DL
GLUCOSE: 101 MG/DL
GLUCOSE: 101 MG/DL
HDL CHOLESTEROL: 41 MG/DL
HEMATOCRIT: 38.4 %
HEMOGLOBIN: 13 G/DL
LDL CHOLESTEROL: 86 MG/DL
PLATELETS: 250 K/UL
POTASSIUM, SERUM: 4.5 MEQ/L
RED BLOOD COUNT: 4.09 X 10-6/U
SGOT (AST): 40 IU/L
SGPT (ALT): 25 IU/L
SODIUM: 137 MEQ/L
THYROID STIMULATING HORMONE: 8.03 MLU/L
TRIGLYCERIDES: 165 MG/DL
WHITE BLOOD COUNT: 6.3 X 10-3/U

## 2018-04-03 ENCOUNTER — ANTI-COAG VISIT (OUTPATIENT)
Dept: INTERNAL MEDICINE CLINIC | Facility: CLINIC | Age: 83
End: 2018-04-03

## 2018-04-03 DIAGNOSIS — Z79.01 LONG TERM (CURRENT) USE OF ANTICOAGULANTS: ICD-10-CM

## 2018-04-03 DIAGNOSIS — I48.91 ATRIAL FIBRILLATION, UNSPECIFIED TYPE (HCC): ICD-10-CM

## 2018-04-03 DIAGNOSIS — I48.20 CHRONIC ATRIAL FIBRILLATION (HCC): ICD-10-CM

## 2018-04-07 ENCOUNTER — HOSPITAL ENCOUNTER (OUTPATIENT)
Dept: CV DIAGNOSTICS | Facility: HOSPITAL | Age: 83
Discharge: HOME | End: 2018-04-07
Attending: INTERNAL MEDICINE
Payer: MEDICARE

## 2018-04-07 DIAGNOSIS — I42.9 PRIMARY CARDIOMYOPATHY (HCC): ICD-10-CM

## 2018-04-07 PROCEDURE — 93306 TTE W/DOPPLER COMPLETE: CPT | Performed by: INTERNAL MEDICINE

## 2018-04-10 ENCOUNTER — TELEPHONE (OUTPATIENT)
Dept: CARDIOLOGY CLINIC | Facility: CLINIC | Age: 83
End: 2018-04-10

## 2018-04-10 NOTE — TELEPHONE ENCOUNTER
[facility] Please transfer call to Cardiology RN at ext. [phone].  Thanks !!

## 2018-04-10 NOTE — TELEPHONE ENCOUNTER
----- Message from SIL Patino sent at 4/10/2018  8:28 AM CDT -----  Echo appears stable from previous, CPM

## 2018-04-16 ENCOUNTER — ANTI-COAG VISIT (OUTPATIENT)
Dept: INTERNAL MEDICINE CLINIC | Facility: CLINIC | Age: 83
End: 2018-04-16

## 2018-04-16 DIAGNOSIS — I48.20 CHRONIC ATRIAL FIBRILLATION (HCC): ICD-10-CM

## 2018-04-16 DIAGNOSIS — Z79.01 LONG TERM (CURRENT) USE OF ANTICOAGULANTS: ICD-10-CM

## 2018-04-16 DIAGNOSIS — I48.91 ATRIAL FIBRILLATION, UNSPECIFIED TYPE (HCC): ICD-10-CM

## 2018-05-03 ENCOUNTER — ANTI-COAG VISIT (OUTPATIENT)
Dept: INTERNAL MEDICINE CLINIC | Facility: CLINIC | Age: 83
End: 2018-05-03

## 2018-05-03 DIAGNOSIS — I48.20 CHRONIC ATRIAL FIBRILLATION (HCC): ICD-10-CM

## 2018-05-03 DIAGNOSIS — I48.91 ATRIAL FIBRILLATION, UNSPECIFIED TYPE (HCC): ICD-10-CM

## 2018-05-03 DIAGNOSIS — Z79.01 LONG TERM (CURRENT) USE OF ANTICOAGULANTS: ICD-10-CM

## 2018-05-15 ENCOUNTER — ANTI-COAG VISIT (OUTPATIENT)
Dept: INTERNAL MEDICINE CLINIC | Facility: CLINIC | Age: 83
End: 2018-05-15

## 2018-05-15 DIAGNOSIS — I48.91 ATRIAL FIBRILLATION, UNSPECIFIED TYPE (HCC): ICD-10-CM

## 2018-05-15 DIAGNOSIS — Z79.01 LONG TERM (CURRENT) USE OF ANTICOAGULANTS: ICD-10-CM

## 2018-05-15 DIAGNOSIS — I48.20 CHRONIC ATRIAL FIBRILLATION (HCC): ICD-10-CM

## 2018-05-29 ENCOUNTER — ANTI-COAG VISIT (OUTPATIENT)
Dept: INTERNAL MEDICINE CLINIC | Facility: CLINIC | Age: 83
End: 2018-05-29

## 2018-05-29 DIAGNOSIS — I48.0 PAROXYSMAL ATRIAL FIBRILLATION (HCC): ICD-10-CM

## 2018-05-29 DIAGNOSIS — I48.91 ATRIAL FIBRILLATION, UNSPECIFIED TYPE (HCC): ICD-10-CM

## 2018-05-29 DIAGNOSIS — Z79.01 LONG TERM (CURRENT) USE OF ANTICOAGULANTS: ICD-10-CM

## 2018-05-29 DIAGNOSIS — I48.20 CHRONIC ATRIAL FIBRILLATION (HCC): ICD-10-CM

## 2018-06-12 ENCOUNTER — ANTI-COAG VISIT (OUTPATIENT)
Dept: INTERNAL MEDICINE CLINIC | Facility: CLINIC | Age: 83
End: 2018-06-12

## 2018-06-12 DIAGNOSIS — I48.91 ATRIAL FIBRILLATION, UNSPECIFIED TYPE (HCC): ICD-10-CM

## 2018-06-12 DIAGNOSIS — I48.20 CHRONIC ATRIAL FIBRILLATION (HCC): ICD-10-CM

## 2018-06-12 DIAGNOSIS — Z79.01 LONG TERM (CURRENT) USE OF ANTICOAGULANTS: ICD-10-CM

## 2018-06-12 DIAGNOSIS — I48.0 PAROXYSMAL ATRIAL FIBRILLATION (HCC): ICD-10-CM

## 2018-06-26 ENCOUNTER — TELEPHONE (OUTPATIENT)
Dept: INTERNAL MEDICINE CLINIC | Facility: CLINIC | Age: 83
End: 2018-06-26

## 2018-06-26 ENCOUNTER — ANTI-COAG VISIT (OUTPATIENT)
Dept: INTERNAL MEDICINE CLINIC | Facility: CLINIC | Age: 83
End: 2018-06-26

## 2018-06-26 DIAGNOSIS — I48.0 PAROXYSMAL ATRIAL FIBRILLATION (HCC): ICD-10-CM

## 2018-06-26 DIAGNOSIS — I48.20 CHRONIC ATRIAL FIBRILLATION (HCC): ICD-10-CM

## 2018-06-26 DIAGNOSIS — I48.91 ATRIAL FIBRILLATION, UNSPECIFIED TYPE (HCC): ICD-10-CM

## 2018-06-26 DIAGNOSIS — Z79.01 LONG TERM (CURRENT) USE OF ANTICOAGULANTS: ICD-10-CM

## 2018-06-26 NOTE — TELEPHONE ENCOUNTER
Pt's son is returning call,     Please see below:    Description     1 mg tab, 2 weeks, 06/26.  Left message with Allyson Nunez, pts son. INR within range.  No change.   RG

## 2018-07-09 ENCOUNTER — ANTI-COAG VISIT (OUTPATIENT)
Dept: INTERNAL MEDICINE CLINIC | Facility: CLINIC | Age: 83
End: 2018-07-09

## 2018-07-09 DIAGNOSIS — Z79.01 LONG TERM (CURRENT) USE OF ANTICOAGULANTS: ICD-10-CM

## 2018-07-09 DIAGNOSIS — I48.20 CHRONIC ATRIAL FIBRILLATION (HCC): ICD-10-CM

## 2018-07-09 DIAGNOSIS — I48.91 ATRIAL FIBRILLATION, UNSPECIFIED TYPE (HCC): ICD-10-CM

## 2018-07-09 LAB — INR: 3 (ref 2–3)

## 2018-07-23 LAB — INR: 2.6 (ref 2–3)

## 2018-07-24 ENCOUNTER — ANTI-COAG VISIT (OUTPATIENT)
Dept: CARDIOLOGY CLINIC | Facility: CLINIC | Age: 83
End: 2018-07-24

## 2018-07-24 DIAGNOSIS — I48.20 CHRONIC ATRIAL FIBRILLATION (HCC): ICD-10-CM

## 2018-07-24 DIAGNOSIS — Z79.01 LONG TERM (CURRENT) USE OF ANTICOAGULANTS: ICD-10-CM

## 2018-07-24 DIAGNOSIS — I48.91 ATRIAL FIBRILLATION, UNSPECIFIED TYPE (HCC): ICD-10-CM

## 2018-08-14 LAB — INR: 2.2 (ref 2–3)

## 2018-08-15 ENCOUNTER — ANTI-COAG VISIT (OUTPATIENT)
Dept: INTERNAL MEDICINE CLINIC | Facility: CLINIC | Age: 83
End: 2018-08-15

## 2018-08-15 ENCOUNTER — MED REC SCAN ONLY (OUTPATIENT)
Dept: CARDIOLOGY CLINIC | Facility: CLINIC | Age: 83
End: 2018-08-15

## 2018-08-15 DIAGNOSIS — Z79.01 LONG TERM (CURRENT) USE OF ANTICOAGULANTS: ICD-10-CM

## 2018-08-15 DIAGNOSIS — I48.91 ATRIAL FIBRILLATION, UNSPECIFIED TYPE (HCC): ICD-10-CM

## 2018-08-15 DIAGNOSIS — I48.20 CHRONIC ATRIAL FIBRILLATION (HCC): ICD-10-CM

## 2018-08-22 ENCOUNTER — TELEPHONE (OUTPATIENT)
Dept: CARDIOLOGY CLINIC | Facility: CLINIC | Age: 83
End: 2018-08-22

## 2018-08-22 DIAGNOSIS — I42.9 PRIMARY CARDIOMYOPATHY (HCC): Primary | ICD-10-CM

## 2018-08-22 NOTE — TELEPHONE ENCOUNTER
Pt son called requesting 80 supply of the following medication pt son requesting all her medications be 90 day supply thank you       Current Outpatient Prescriptions:     •  DilTIAZem HCl ER Coated Beads (CARTIA XT) 120 MG Oral Capsule SR 24 Hr, Take 1 ca

## 2018-08-23 NOTE — TELEPHONE ENCOUNTER
RE: Diltiazem LOV reviewed, no change in this medication  No BMP or CMP on file since 2016 reviewed many scanned labs which were INRs. 30 tabs with 0 refills pended.  Please advise    Hypertensive Medications  Protocol Criteria:  · Appointment scheduled

## 2018-08-24 RX ORDER — DILTIAZEM HYDROCHLORIDE 120 MG/1
120 CAPSULE, COATED, EXTENDED RELEASE ORAL
Qty: 30 CAPSULE | Refills: 0 | Status: SHIPPED | OUTPATIENT
Start: 2018-08-24 | End: 2018-08-28

## 2018-08-24 NOTE — TELEPHONE ENCOUNTER
30 day supply sent to pharmacy.  Advised son we need lab results to fill larger quantity of medication. (lab order entered) States he wants to hold off on the 30 day supply and he'll take her for labs on Saturday, has enough meds to last until Tuesday or We

## 2018-08-25 ENCOUNTER — APPOINTMENT (OUTPATIENT)
Dept: LAB | Facility: HOSPITAL | Age: 83
End: 2018-08-25
Attending: NURSE PRACTITIONER
Payer: MEDICARE

## 2018-08-25 DIAGNOSIS — I42.9 PRIMARY CARDIOMYOPATHY (HCC): ICD-10-CM

## 2018-08-25 LAB
ANION GAP SERPL CALC-SCNC: 7 MMOL/L (ref 0–18)
BUN SERPL-MCNC: 21 MG/DL (ref 8–20)
BUN/CREAT SERPL: 18.1 (ref 10–20)
CALCIUM SERPL-MCNC: 8.9 MG/DL (ref 8.5–10.5)
CHLORIDE SERPL-SCNC: 105 MMOL/L (ref 95–110)
CO2 SERPL-SCNC: 28 MMOL/L (ref 22–32)
CREAT SERPL-MCNC: 1.16 MG/DL (ref 0.5–1.5)
GLUCOSE SERPL-MCNC: 143 MG/DL (ref 70–99)
OSMOLALITY UR CALC.SUM OF ELEC: 295 MOSM/KG (ref 275–295)
POTASSIUM SERPL-SCNC: 4.6 MMOL/L (ref 3.3–5.1)
SODIUM SERPL-SCNC: 140 MMOL/L (ref 136–144)

## 2018-08-25 PROCEDURE — 80048 BASIC METABOLIC PNL TOTAL CA: CPT

## 2018-08-25 PROCEDURE — 36415 COLL VENOUS BLD VENIPUNCTURE: CPT

## 2018-08-27 ENCOUNTER — ANTI-COAG VISIT (OUTPATIENT)
Dept: INTERNAL MEDICINE CLINIC | Facility: CLINIC | Age: 83
End: 2018-08-27

## 2018-08-27 DIAGNOSIS — I48.20 CHRONIC ATRIAL FIBRILLATION (HCC): ICD-10-CM

## 2018-08-27 DIAGNOSIS — Z79.01 LONG TERM (CURRENT) USE OF ANTICOAGULANTS: ICD-10-CM

## 2018-08-27 DIAGNOSIS — I48.91 ATRIAL FIBRILLATION, UNSPECIFIED TYPE (HCC): ICD-10-CM

## 2018-08-27 LAB — INR: 2.3 (ref 2–3)

## 2018-08-27 NOTE — TELEPHONE ENCOUNTER
Isis Edward, Patient had labs done 8/25. Please advise if I can send 90 day supply in. She is down to just a couple of days left, wanted to wait to refill until qualified to get 90 days. Thank you.

## 2018-08-28 ENCOUNTER — TELEPHONE (OUTPATIENT)
Dept: CARDIOLOGY CLINIC | Facility: CLINIC | Age: 83
End: 2018-08-28

## 2018-08-28 RX ORDER — DILTIAZEM HYDROCHLORIDE 120 MG/1
120 CAPSULE, COATED, EXTENDED RELEASE ORAL
Qty: 90 CAPSULE | Refills: 1 | Status: SHIPPED | OUTPATIENT
Start: 2018-08-28 | End: 2019-02-09

## 2018-08-28 NOTE — TELEPHONE ENCOUNTER
S/w son regarding his concern that patient has labs done prior to needing refills. Reviewed medications filled by this office, refill protocol stats labs within 1 year.  Expressed understanding but wants to talk to  [facility] at next office visit emeka

## 2018-08-28 NOTE — TELEPHONE ENCOUNTER
Labs completed, meets refill protocol criteria. 90 day supply sent per patient requestion. Call placed to patients son to advise. Message left.     Hypertensive Medications  Protocol Criteria:  · Appointment scheduled with Cardiology in the past 12 months o

## 2018-09-10 LAB — INR: 3 (ref 2–3)

## 2018-09-11 ENCOUNTER — ANTI-COAG VISIT (OUTPATIENT)
Dept: INTERNAL MEDICINE CLINIC | Facility: CLINIC | Age: 83
End: 2018-09-11

## 2018-09-11 DIAGNOSIS — Z79.01 LONG TERM (CURRENT) USE OF ANTICOAGULANTS: ICD-10-CM

## 2018-09-11 DIAGNOSIS — I48.91 ATRIAL FIBRILLATION, UNSPECIFIED TYPE (HCC): ICD-10-CM

## 2018-09-11 DIAGNOSIS — I48.20 CHRONIC ATRIAL FIBRILLATION (HCC): ICD-10-CM

## 2018-09-24 LAB — INR: 2.6 (ref 2–3)

## 2018-09-26 ENCOUNTER — ANTI-COAG VISIT (OUTPATIENT)
Dept: INTERNAL MEDICINE CLINIC | Facility: CLINIC | Age: 83
End: 2018-09-26

## 2018-09-26 DIAGNOSIS — I48.91 ATRIAL FIBRILLATION, UNSPECIFIED TYPE (HCC): ICD-10-CM

## 2018-09-26 DIAGNOSIS — Z79.01 LONG TERM (CURRENT) USE OF ANTICOAGULANTS: ICD-10-CM

## 2018-09-26 DIAGNOSIS — I48.20 CHRONIC ATRIAL FIBRILLATION (HCC): ICD-10-CM

## 2018-09-27 ENCOUNTER — OFFICE VISIT (OUTPATIENT)
Dept: CARDIOLOGY CLINIC | Facility: CLINIC | Age: 83
End: 2018-09-27
Payer: MEDICARE

## 2018-09-27 VITALS
HEIGHT: 62 IN | WEIGHT: 160 LBS | BODY MASS INDEX: 29.44 KG/M2 | HEART RATE: 61 BPM | SYSTOLIC BLOOD PRESSURE: 154 MMHG | DIASTOLIC BLOOD PRESSURE: 97 MMHG

## 2018-09-27 DIAGNOSIS — I10 HYPERTENSION, BENIGN: ICD-10-CM

## 2018-09-27 DIAGNOSIS — I42.9 PRIMARY CARDIOMYOPATHY (HCC): ICD-10-CM

## 2018-09-27 DIAGNOSIS — I48.20 CHRONIC ATRIAL FIBRILLATION (HCC): Primary | ICD-10-CM

## 2018-09-27 PROCEDURE — 99214 OFFICE O/P EST MOD 30 MIN: CPT | Performed by: INTERNAL MEDICINE

## 2018-09-27 PROCEDURE — G0463 HOSPITAL OUTPT CLINIC VISIT: HCPCS | Performed by: INTERNAL MEDICINE

## 2018-09-27 RX ORDER — AMOXICILLIN 500 MG/1
500 CAPSULE ORAL
Refills: 0 | COMMUNITY
Start: 2018-04-20

## 2018-09-27 RX ORDER — FENOFIBRATE 48 MG/1
48 TABLET, COATED ORAL
COMMUNITY
Start: 2018-03-05

## 2018-09-27 RX ORDER — POLYETHYLENE GLYCOL 3350 17 G/17G
17 POWDER, FOR SOLUTION ORAL DAILY
Status: ON HOLD | COMMUNITY
Start: 2018-03-05 | End: 2020-01-01

## 2018-09-27 RX ORDER — ESTRADIOL 0.1 MG/G
CREAM VAGINAL
COMMUNITY
Start: 2018-03-05

## 2018-09-27 NOTE — PROGRESS NOTES
Alana Castillo is a 80year old female. Patient presents with:   Follow - Up: 6 month  a fib,refill meds ,patient denies sob or chest pain    HPI:   This is a pleasant 80year-old with history of atrial fibrillation elevated cholesterol hypertension cardiom file.   Social History:  Social History    Tobacco Use      Smoking status: Never Smoker      Smokeless tobacco: Never Used    Alcohol use: No      Frequency: Never    Drug use: No       REVIEW OF SYSTEMS:   GENERAL HEALTH: feels well otherwise  SKIN: angie time.  She will call if changes. She also has a history of ICD which should be followed by EP.   Patient will continue blood thinners to decrease stroke risks which is monitored by the Coumadin clinic  The patient indicates understanding of these issues an

## 2018-10-10 ENCOUNTER — ANTI-COAG VISIT (OUTPATIENT)
Dept: INTERNAL MEDICINE CLINIC | Facility: CLINIC | Age: 83
End: 2018-10-10

## 2018-10-10 DIAGNOSIS — I48.91 ATRIAL FIBRILLATION, UNSPECIFIED TYPE (HCC): ICD-10-CM

## 2018-10-10 DIAGNOSIS — Z79.01 LONG TERM (CURRENT) USE OF ANTICOAGULANTS: ICD-10-CM

## 2018-10-10 DIAGNOSIS — I48.20 CHRONIC ATRIAL FIBRILLATION (HCC): ICD-10-CM

## 2018-10-25 ENCOUNTER — ANTI-COAG VISIT (OUTPATIENT)
Dept: INTERNAL MEDICINE CLINIC | Facility: CLINIC | Age: 83
End: 2018-10-25

## 2018-10-25 DIAGNOSIS — Z79.01 LONG TERM (CURRENT) USE OF ANTICOAGULANTS: ICD-10-CM

## 2018-10-25 DIAGNOSIS — I48.20 CHRONIC ATRIAL FIBRILLATION (HCC): ICD-10-CM

## 2018-10-25 DIAGNOSIS — I48.91 ATRIAL FIBRILLATION, UNSPECIFIED TYPE (HCC): ICD-10-CM

## 2018-11-06 ENCOUNTER — MED REC SCAN ONLY (OUTPATIENT)
Dept: CARDIOLOGY CLINIC | Facility: CLINIC | Age: 83
End: 2018-11-06

## 2018-11-06 ENCOUNTER — ANTI-COAG VISIT (OUTPATIENT)
Dept: CARDIOLOGY CLINIC | Facility: CLINIC | Age: 83
End: 2018-11-06

## 2018-11-06 DIAGNOSIS — I48.20 CHRONIC ATRIAL FIBRILLATION (HCC): ICD-10-CM

## 2018-11-06 DIAGNOSIS — I48.91 ATRIAL FIBRILLATION, UNSPECIFIED TYPE (HCC): ICD-10-CM

## 2018-11-06 DIAGNOSIS — Z79.01 LONG TERM (CURRENT) USE OF ANTICOAGULANTS: ICD-10-CM

## 2018-11-09 ENCOUNTER — TELEPHONE (OUTPATIENT)
Dept: INTERNAL MEDICINE CLINIC | Facility: CLINIC | Age: 83
End: 2018-11-09

## 2018-11-09 ENCOUNTER — ANTI-COAG VISIT (OUTPATIENT)
Dept: INTERNAL MEDICINE CLINIC | Facility: CLINIC | Age: 83
End: 2018-11-09

## 2018-11-09 DIAGNOSIS — I48.91 ATRIAL FIBRILLATION, UNSPECIFIED TYPE (HCC): ICD-10-CM

## 2018-11-09 DIAGNOSIS — Z79.01 LONG TERM (CURRENT) USE OF ANTICOAGULANTS: ICD-10-CM

## 2018-11-09 DIAGNOSIS — I48.20 CHRONIC ATRIAL FIBRILLATION (HCC): ICD-10-CM

## 2018-11-09 NOTE — TELEPHONE ENCOUNTER
Per Manpreet Jones, when he talked to [place] they told him that the Order that he received last wk he should disregard about test strips. And he's so luck that he had used the last one from 6 months ago's supply. The last reading should be good. Manpreet Jones says that he should had more supplies for the next reading.

## 2018-11-15 ENCOUNTER — MYAURORA ACCOUNT LINK (OUTPATIENT)
Dept: OTHER | Age: 83
End: 2018-11-15

## 2018-11-23 ENCOUNTER — ANTI-COAG VISIT (OUTPATIENT)
Dept: INTERNAL MEDICINE CLINIC | Facility: CLINIC | Age: 83
End: 2018-11-23

## 2018-11-23 DIAGNOSIS — Z79.01 LONG TERM (CURRENT) USE OF ANTICOAGULANTS: ICD-10-CM

## 2018-11-23 DIAGNOSIS — I48.91 ATRIAL FIBRILLATION, UNSPECIFIED TYPE (HCC): ICD-10-CM

## 2018-11-23 DIAGNOSIS — I48.20 CHRONIC ATRIAL FIBRILLATION (HCC): ICD-10-CM

## 2018-11-23 PROCEDURE — 93793 ANTICOAG MGMT PT WARFARIN: CPT | Performed by: INTERNAL MEDICINE

## 2018-12-03 ENCOUNTER — ANTI-COAG VISIT (OUTPATIENT)
Dept: CARDIOLOGY CLINIC | Facility: CLINIC | Age: 83
End: 2018-12-03

## 2018-12-03 ENCOUNTER — MED REC SCAN ONLY (OUTPATIENT)
Dept: CARDIOLOGY CLINIC | Facility: CLINIC | Age: 83
End: 2018-12-03

## 2018-12-03 DIAGNOSIS — Z79.01 LONG TERM (CURRENT) USE OF ANTICOAGULANTS: ICD-10-CM

## 2018-12-03 DIAGNOSIS — I48.91 ATRIAL FIBRILLATION, UNSPECIFIED TYPE (HCC): ICD-10-CM

## 2018-12-03 DIAGNOSIS — I48.20 CHRONIC ATRIAL FIBRILLATION (HCC): ICD-10-CM

## 2018-12-03 PROCEDURE — 93793 ANTICOAG MGMT PT WARFARIN: CPT | Performed by: INTERNAL MEDICINE

## 2018-12-20 ENCOUNTER — ANTI-COAG VISIT (OUTPATIENT)
Dept: INTERNAL MEDICINE CLINIC | Facility: CLINIC | Age: 83
End: 2018-12-20

## 2018-12-20 DIAGNOSIS — I48.20 CHRONIC ATRIAL FIBRILLATION (HCC): ICD-10-CM

## 2018-12-20 DIAGNOSIS — I48.91 ATRIAL FIBRILLATION, UNSPECIFIED TYPE (HCC): ICD-10-CM

## 2018-12-20 DIAGNOSIS — Z79.01 LONG TERM (CURRENT) USE OF ANTICOAGULANTS: ICD-10-CM

## 2019-01-03 ENCOUNTER — ANTI-COAG VISIT (OUTPATIENT)
Dept: INTERNAL MEDICINE CLINIC | Facility: CLINIC | Age: 84
End: 2019-01-03

## 2019-01-03 DIAGNOSIS — I48.91 ATRIAL FIBRILLATION, UNSPECIFIED TYPE (HCC): ICD-10-CM

## 2019-01-03 DIAGNOSIS — I48.20 CHRONIC ATRIAL FIBRILLATION (HCC): ICD-10-CM

## 2019-01-03 DIAGNOSIS — Z79.01 LONG TERM (CURRENT) USE OF ANTICOAGULANTS: ICD-10-CM

## 2019-01-03 LAB — INR: 2.4 (ref 2–3)

## 2019-01-03 PROCEDURE — 93793 ANTICOAG MGMT PT WARFARIN: CPT | Performed by: INTERNAL MEDICINE

## 2019-01-09 ENCOUNTER — MED REC SCAN ONLY (OUTPATIENT)
Dept: INTERNAL MEDICINE CLINIC | Facility: CLINIC | Age: 84
End: 2019-01-09

## 2019-01-14 LAB — INR: 2.1 (ref 2–3)

## 2019-01-15 ENCOUNTER — ANTI-COAG VISIT (OUTPATIENT)
Dept: INTERNAL MEDICINE CLINIC | Facility: CLINIC | Age: 84
End: 2019-01-15

## 2019-01-15 DIAGNOSIS — I48.91 ATRIAL FIBRILLATION, UNSPECIFIED TYPE (HCC): ICD-10-CM

## 2019-01-15 DIAGNOSIS — I48.20 CHRONIC ATRIAL FIBRILLATION (HCC): ICD-10-CM

## 2019-01-15 DIAGNOSIS — Z79.01 LONG TERM (CURRENT) USE OF ANTICOAGULANTS: ICD-10-CM

## 2019-01-15 DIAGNOSIS — I48.0 PAROXYSMAL ATRIAL FIBRILLATION (HCC): ICD-10-CM

## 2019-01-15 PROCEDURE — 93793 ANTICOAG MGMT PT WARFARIN: CPT | Performed by: INTERNAL MEDICINE

## 2019-01-28 ENCOUNTER — ANTI-COAG VISIT (OUTPATIENT)
Dept: INTERNAL MEDICINE CLINIC | Facility: CLINIC | Age: 84
End: 2019-01-28

## 2019-01-28 DIAGNOSIS — I48.91 ATRIAL FIBRILLATION, UNSPECIFIED TYPE (HCC): ICD-10-CM

## 2019-01-28 DIAGNOSIS — I48.0 PAROXYSMAL ATRIAL FIBRILLATION (HCC): ICD-10-CM

## 2019-01-28 DIAGNOSIS — Z79.01 LONG TERM (CURRENT) USE OF ANTICOAGULANTS: ICD-10-CM

## 2019-01-28 DIAGNOSIS — I48.20 CHRONIC ATRIAL FIBRILLATION (HCC): ICD-10-CM

## 2019-01-28 LAB — INR: 2.2 (ref 2–3)

## 2019-02-02 ENCOUNTER — PRIOR ORIGINAL RECORDS (OUTPATIENT)
Dept: OTHER | Age: 84
End: 2019-02-02

## 2019-02-02 ENCOUNTER — APPOINTMENT (OUTPATIENT)
Dept: LAB | Facility: HOSPITAL | Age: 84
End: 2019-02-02
Attending: INTERNAL MEDICINE
Payer: MEDICARE

## 2019-02-02 DIAGNOSIS — I42.9 PRIMARY CARDIOMYOPATHY (HCC): ICD-10-CM

## 2019-02-02 DIAGNOSIS — I48.20 CHRONIC ATRIAL FIBRILLATION (HCC): ICD-10-CM

## 2019-02-02 DIAGNOSIS — I10 HYPERTENSION, BENIGN: ICD-10-CM

## 2019-02-02 LAB
ANION GAP SERPL CALC-SCNC: 12 MMOL/L (ref 0–18)
BUN SERPL-MCNC: 18 MG/DL (ref 8–20)
BUN/CREAT SERPL: 15.8 (ref 10–20)
CALCIUM SERPL-MCNC: 8.9 MG/DL (ref 8.5–10.5)
CHLORIDE SERPL-SCNC: 102 MMOL/L (ref 95–110)
CO2 SERPL-SCNC: 25 MMOL/L (ref 22–32)
CREAT SERPL-MCNC: 1.14 MG/DL (ref 0.5–1.5)
DEPRECATED RDW RBC AUTO: 49.1 FL (ref 35.1–46.3)
ERYTHROCYTE [DISTWIDTH] IN BLOOD BY AUTOMATED COUNT: 13.3 % (ref 11–15)
GLUCOSE SERPL-MCNC: 90 MG/DL (ref 70–99)
HCT VFR BLD AUTO: 38.8 % (ref 35–48)
HGB BLD-MCNC: 12.6 G/DL (ref 12–16)
MCH RBC QN AUTO: 32.1 PG (ref 26–34)
MCHC RBC AUTO-ENTMCNC: 32.5 G/DL (ref 31–37)
MCV RBC AUTO: 98.7 FL (ref 80–100)
OSMOLALITY UR CALC.SUM OF ELEC: 289 MOSM/KG (ref 275–295)
PLATELET # BLD AUTO: 248 10(3)UL (ref 150–450)
POTASSIUM SERPL-SCNC: 4.3 MMOL/L (ref 3.3–5.1)
RBC # BLD AUTO: 3.93 X10(6)UL (ref 3.8–5.3)
SODIUM SERPL-SCNC: 139 MMOL/L (ref 136–144)
TSH SERPL-ACNC: 3.4 UIU/ML (ref 0.45–5.33)
WBC # BLD AUTO: 5.1 X10(3) UL (ref 4–11)

## 2019-02-02 PROCEDURE — 85027 COMPLETE CBC AUTOMATED: CPT

## 2019-02-02 PROCEDURE — 84443 ASSAY THYROID STIM HORMONE: CPT

## 2019-02-02 PROCEDURE — 80048 BASIC METABOLIC PNL TOTAL CA: CPT

## 2019-02-02 PROCEDURE — 36415 COLL VENOUS BLD VENIPUNCTURE: CPT

## 2019-02-04 LAB
BUN: 18 MG/DL
CALCIUM: 8.9 MG/DL
CHLORIDE: 102 MEQ/L
CREATININE, SERUM: 1.14 MG/DL
GLUCOSE: 90 MG/DL
GLUCOSE: 90 MG/DL
HEMATOCRIT: 38.8 %
HEMOGLOBIN: 12.6 G/DL
PLATELETS: 248 K/UL
POTASSIUM, SERUM: 4.3 MEQ/L
RED BLOOD COUNT: 3.93 X 10-6/U
SODIUM: 139 MEQ/L
THYROID STIMULATING HORMONE: 3.4 MLU/L
WHITE BLOOD COUNT: 5.1 X 10-3/U

## 2019-02-11 ENCOUNTER — MED REC SCAN ONLY (OUTPATIENT)
Dept: INTERNAL MEDICINE CLINIC | Facility: CLINIC | Age: 84
End: 2019-02-11

## 2019-02-11 ENCOUNTER — ANTI-COAG VISIT (OUTPATIENT)
Dept: INTERNAL MEDICINE CLINIC | Facility: CLINIC | Age: 84
End: 2019-02-11

## 2019-02-11 DIAGNOSIS — Z79.01 LONG TERM (CURRENT) USE OF ANTICOAGULANTS: ICD-10-CM

## 2019-02-11 DIAGNOSIS — I48.91 ATRIAL FIBRILLATION, UNSPECIFIED TYPE (HCC): ICD-10-CM

## 2019-02-11 DIAGNOSIS — I48.20 CHRONIC ATRIAL FIBRILLATION (HCC): ICD-10-CM

## 2019-02-11 LAB — INR: 3.1 (ref 2–3)

## 2019-02-11 PROCEDURE — 93793 ANTICOAG MGMT PT WARFARIN: CPT | Performed by: INTERNAL MEDICINE

## 2019-02-13 RX ORDER — DILTIAZEM HYDROCHLORIDE 120 MG/1
CAPSULE, EXTENDED RELEASE ORAL
Qty: 90 CAPSULE | Refills: 1 | Status: SHIPPED | OUTPATIENT
Start: 2019-02-13 | End: 2020-01-01

## 2019-02-25 LAB — INR: 2.2 (ref 2–3)

## 2019-02-26 ENCOUNTER — ANTI-COAG VISIT (OUTPATIENT)
Dept: INTERNAL MEDICINE CLINIC | Facility: CLINIC | Age: 84
End: 2019-02-26

## 2019-02-26 ENCOUNTER — OFFICE VISIT (OUTPATIENT)
Dept: OPHTHALMOLOGY | Facility: CLINIC | Age: 84
End: 2019-02-26
Payer: MEDICARE

## 2019-02-26 DIAGNOSIS — H43.391 FLOATER, VITREOUS, RIGHT: ICD-10-CM

## 2019-02-26 DIAGNOSIS — I48.91 ATRIAL FIBRILLATION, UNSPECIFIED TYPE (HCC): ICD-10-CM

## 2019-02-26 DIAGNOSIS — T85.612A ERODED CORNEAL SUTURE, INITIAL ENCOUNTER: ICD-10-CM

## 2019-02-26 DIAGNOSIS — Z79.01 LONG TERM (CURRENT) USE OF ANTICOAGULANTS: ICD-10-CM

## 2019-02-26 DIAGNOSIS — Z96.1 PSEUDOPHAKIA OF BOTH EYES: ICD-10-CM

## 2019-02-26 DIAGNOSIS — H35.3130 BILATERAL NONEXUDATIVE AGE-RELATED MACULAR DEGENERATION, UNSPECIFIED STAGE: Primary | ICD-10-CM

## 2019-02-26 DIAGNOSIS — I48.20 CHRONIC ATRIAL FIBRILLATION (HCC): ICD-10-CM

## 2019-02-26 DIAGNOSIS — I48.0 PAROXYSMAL ATRIAL FIBRILLATION (HCC): ICD-10-CM

## 2019-02-26 DIAGNOSIS — H04.203 TEARING, BILATERAL: ICD-10-CM

## 2019-02-26 PROCEDURE — 92014 COMPRE OPH EXAM EST PT 1/>: CPT | Performed by: OPHTHALMOLOGY

## 2019-02-26 NOTE — PROGRESS NOTES
Tamar Ingram is a 80year old female. HPI:     HPI     Patient is here for yearly eye exam.  Patient denies any problems or changes with her eyes.       Last edited by Kimi Camacoh on 2/26/2019  4:04 PM. (History)        Patient History:  Past Medical Tab Take 1 tablet by mouth daily. Disp:  Rfl: 6   alprazolam (XANAX) 0.25 MG Oral Tab Take 1 tablet by mouth 3 (three) times daily as needed. Disp:  Rfl:    furosemide (LASIX) 20 MG Oral Tab Take 1 tablet by mouth daily.  Disp:  Rfl:        Allergies:  No K over the counter glasses for reading. Floater, vitreous, right  No treatment. Age-related macular degeneration, dry  Discussed early macular degeneration changes in both eyes with the patient.   Stressed importance of taking either daily multi-malik

## 2019-02-26 NOTE — PATIENT INSTRUCTIONS
Pseudophakia of both eyes  No treatment. Suggest over the counter glasses for reading. Floater, vitreous, right  No treatment. Age-related macular degeneration, dry  Discussed early macular degeneration changes in both eyes with the patient.   St

## 2019-02-28 VITALS
SYSTOLIC BLOOD PRESSURE: 140 MMHG | HEART RATE: 55 BPM | OXYGEN SATURATION: 98 % | WEIGHT: 158 LBS | BODY MASS INDEX: 29.83 KG/M2 | HEIGHT: 61 IN | DIASTOLIC BLOOD PRESSURE: 86 MMHG

## 2019-03-01 VITALS
SYSTOLIC BLOOD PRESSURE: 148 MMHG | OXYGEN SATURATION: 98 % | RESPIRATION RATE: 8 BRPM | BODY MASS INDEX: 33.18 KG/M2 | HEART RATE: 60 BPM | HEIGHT: 60 IN | DIASTOLIC BLOOD PRESSURE: 88 MMHG | WEIGHT: 169 LBS

## 2019-03-08 RX ORDER — AMIODARONE HYDROCHLORIDE 200 MG/1
1 TABLET ORAL DAILY
COMMUNITY
Start: 2018-03-20 | End: 2019-03-24 | Stop reason: SDUPTHER

## 2019-03-08 RX ORDER — CARVEDILOL 12.5 MG/1
TABLET ORAL
COMMUNITY
Start: 2016-03-01

## 2019-03-08 RX ORDER — FUROSEMIDE 20 MG/1
TABLET ORAL
COMMUNITY
Start: 2014-01-23

## 2019-03-08 RX ORDER — ALPRAZOLAM 0.25 MG/1
TABLET ORAL
COMMUNITY
Start: 2014-09-05

## 2019-03-08 RX ORDER — WARFARIN SODIUM 1 MG/1
TABLET ORAL
COMMUNITY
Start: 2010-06-01

## 2019-03-08 RX ORDER — DILTIAZEM HYDROCHLORIDE 120 MG/1
CAPSULE, COATED, EXTENDED RELEASE ORAL
COMMUNITY
Start: 2014-01-23 | End: 2020-01-01

## 2019-03-13 PROBLEM — I50.20 SYSTOLIC HEART FAILURE (CMD): Status: ACTIVE | Noted: 2019-03-13

## 2019-03-13 PROBLEM — E78.00 HYPERCHOLESTEROLEMIA: Status: ACTIVE | Noted: 2019-03-13

## 2019-03-13 PROBLEM — I49.01 VENTRICULAR FIBRILLATION (CMD): Status: ACTIVE | Noted: 2019-03-13

## 2019-03-13 PROBLEM — Z95.810 ICD (IMPLANTABLE CARDIOVERTER-DEFIBRILLATOR) IN PLACE: Status: ACTIVE | Noted: 2019-03-13

## 2019-03-13 RX ORDER — DILTIAZEM HYDROCHLORIDE 180 MG/1
180 CAPSULE, EXTENDED RELEASE ORAL
COMMUNITY
Start: 2019-03-04

## 2019-03-13 RX ORDER — METRONIDAZOLE 7.5 MG/G
GEL TOPICAL
COMMUNITY
Start: 2016-05-09

## 2019-03-13 RX ORDER — POLYETHYLENE GLYCOL 3350 17 G/17G
17 POWDER, FOR SOLUTION ORAL DAILY
COMMUNITY
Start: 2018-03-05

## 2019-03-13 RX ORDER — BETA-GLUCAN, (1-3) (1-4) 70 %
POWDER (GRAM) MISCELLANEOUS
COMMUNITY
Start: 2018-03-05

## 2019-03-13 RX ORDER — LEVOTHYROXINE SODIUM 137 UG/1
137 TABLET ORAL
COMMUNITY
Start: 2017-12-14

## 2019-03-13 RX ORDER — ESTRADIOL 0.1 MG/G
CREAM VAGINAL
COMMUNITY
Start: 2018-03-05

## 2019-03-14 LAB — INR: 2.2 (ref 2–3)

## 2019-03-15 ENCOUNTER — ANTI-COAG VISIT (OUTPATIENT)
Dept: INTERNAL MEDICINE CLINIC | Facility: CLINIC | Age: 84
End: 2019-03-15

## 2019-03-15 DIAGNOSIS — Z79.01 LONG TERM (CURRENT) USE OF ANTICOAGULANTS: ICD-10-CM

## 2019-03-15 DIAGNOSIS — I48.20 CHRONIC ATRIAL FIBRILLATION (HCC): ICD-10-CM

## 2019-03-15 DIAGNOSIS — I48.91 ATRIAL FIBRILLATION, UNSPECIFIED TYPE (HCC): ICD-10-CM

## 2019-03-15 PROCEDURE — 93793 ANTICOAG MGMT PT WARFARIN: CPT | Performed by: INTERNAL MEDICINE

## 2019-03-19 ENCOUNTER — ANCILLARY PROCEDURE (OUTPATIENT)
Dept: CARDIOLOGY | Age: 84
End: 2019-03-19
Attending: INTERNAL MEDICINE

## 2019-03-19 ENCOUNTER — OFFICE VISIT (OUTPATIENT)
Dept: CARDIOLOGY | Age: 84
End: 2019-03-19

## 2019-03-19 ENCOUNTER — TELEPHONE (OUTPATIENT)
Dept: CARDIOLOGY | Age: 84
End: 2019-03-19

## 2019-03-19 VITALS
BODY MASS INDEX: 32.12 KG/M2 | HEART RATE: 64 BPM | DIASTOLIC BLOOD PRESSURE: 80 MMHG | RESPIRATION RATE: 16 BRPM | SYSTOLIC BLOOD PRESSURE: 144 MMHG | WEIGHT: 170 LBS

## 2019-03-19 VITALS
BODY MASS INDEX: 32.12 KG/M2 | HEART RATE: 63 BPM | WEIGHT: 170 LBS | SYSTOLIC BLOOD PRESSURE: 144 MMHG | DIASTOLIC BLOOD PRESSURE: 80 MMHG

## 2019-03-19 DIAGNOSIS — I50.22 CHRONIC SYSTOLIC CONGESTIVE HEART FAILURE (CMD): ICD-10-CM

## 2019-03-19 DIAGNOSIS — I48.21 PERMANENT ATRIAL FIBRILLATION (CMD): ICD-10-CM

## 2019-03-19 DIAGNOSIS — Z45.02 IMPLANTABLE DEFIBRILLATOR REPROGRAMMING/CHECK: ICD-10-CM

## 2019-03-19 DIAGNOSIS — Z79.899 ENCOUNTER FOR LONG-TERM (CURRENT) DRUG USE: ICD-10-CM

## 2019-03-19 DIAGNOSIS — I49.01 VENTRICULAR FIBRILLATION (CMD): Primary | ICD-10-CM

## 2019-03-19 PROCEDURE — 93000 ELECTROCARDIOGRAM COMPLETE: CPT | Performed by: INTERNAL MEDICINE

## 2019-03-19 PROCEDURE — 99214 OFFICE O/P EST MOD 30 MIN: CPT | Performed by: INTERNAL MEDICINE

## 2019-03-19 PROCEDURE — X1094 ELECTROCARDIOGRAM 12-LEAD: HCPCS | Performed by: INTERNAL MEDICINE

## 2019-03-19 SDOH — HEALTH STABILITY: MENTAL HEALTH: HOW OFTEN DO YOU HAVE A DRINK CONTAINING ALCOHOL?: NEVER

## 2019-03-25 LAB — INR: 2.3 (ref 2–3)

## 2019-03-25 RX ORDER — AMIODARONE HYDROCHLORIDE 200 MG/1
TABLET ORAL
Qty: 90 TABLET | Refills: 0 | Status: SHIPPED | OUTPATIENT
Start: 2019-03-25 | End: 2019-06-09 | Stop reason: SDUPTHER

## 2019-03-26 ENCOUNTER — ANTI-COAG VISIT (OUTPATIENT)
Dept: INTERNAL MEDICINE CLINIC | Facility: CLINIC | Age: 84
End: 2019-03-26

## 2019-03-26 DIAGNOSIS — Z79.01 LONG TERM (CURRENT) USE OF ANTICOAGULANTS: ICD-10-CM

## 2019-03-26 DIAGNOSIS — I48.91 ATRIAL FIBRILLATION, UNSPECIFIED TYPE (HCC): ICD-10-CM

## 2019-03-26 DIAGNOSIS — I48.20 CHRONIC ATRIAL FIBRILLATION (HCC): ICD-10-CM

## 2019-03-26 PROCEDURE — 93793 ANTICOAG MGMT PT WARFARIN: CPT | Performed by: INTERNAL MEDICINE

## 2019-04-10 ENCOUNTER — ANTI-COAG VISIT (OUTPATIENT)
Dept: INTERNAL MEDICINE CLINIC | Facility: CLINIC | Age: 84
End: 2019-04-10

## 2019-04-10 DIAGNOSIS — I48.20 CHRONIC ATRIAL FIBRILLATION (HCC): ICD-10-CM

## 2019-04-10 DIAGNOSIS — Z79.01 LONG TERM (CURRENT) USE OF ANTICOAGULANTS: ICD-10-CM

## 2019-04-10 DIAGNOSIS — I48.91 ATRIAL FIBRILLATION, UNSPECIFIED TYPE (HCC): ICD-10-CM

## 2019-04-10 PROCEDURE — 93793 ANTICOAG MGMT PT WARFARIN: CPT | Performed by: INTERNAL MEDICINE

## 2019-04-11 ENCOUNTER — OFFICE VISIT (OUTPATIENT)
Dept: CARDIOLOGY CLINIC | Facility: CLINIC | Age: 84
End: 2019-04-11
Payer: MEDICARE

## 2019-04-11 VITALS
SYSTOLIC BLOOD PRESSURE: 151 MMHG | OXYGEN SATURATION: 98 % | BODY MASS INDEX: 31 KG/M2 | DIASTOLIC BLOOD PRESSURE: 79 MMHG | HEART RATE: 58 BPM | RESPIRATION RATE: 20 BRPM | WEIGHT: 172 LBS

## 2019-04-11 DIAGNOSIS — I42.9 PRIMARY CARDIOMYOPATHY (HCC): ICD-10-CM

## 2019-04-11 DIAGNOSIS — I48.20 CHRONIC ATRIAL FIBRILLATION (HCC): Primary | ICD-10-CM

## 2019-04-11 DIAGNOSIS — I10 HYPERTENSION, BENIGN: ICD-10-CM

## 2019-04-11 PROCEDURE — G0463 HOSPITAL OUTPT CLINIC VISIT: HCPCS | Performed by: INTERNAL MEDICINE

## 2019-04-11 PROCEDURE — 99214 OFFICE O/P EST MOD 30 MIN: CPT | Performed by: INTERNAL MEDICINE

## 2019-04-11 NOTE — PROGRESS NOTES
[address] [place] [address] NOTE    Theo Manley is a 80year old female. Patient presents with:   Follow - Up  Atrial Fibrillation  Cardiomyopathy  Hypertension  Edema: Lower legs     HPI:   This is a pleasant 80year old female with history of atrial fibr Rfl: 0   FLUZONE HIGH-DOSE 0.5 ML Intramuscular Suspension Prefilled Syringe ADM 0.5ML IM UTD Disp:  Rfl: 0   BACTROBAN NASAL 2 % Nasal Ointment  Disp:  Rfl:    Calcium Carbonate-Vitamin D (OS-JEANNIE 500 + D) 500-200 MG-UNIT Oral Tab Take by mouth.  Disp:  Rf LIPID PANEL    ALT (SGPT)    AST (SGOT)    TSH W REFLEX TO FREE T4    BASIC METABOLIC PANEL (8)    Chronic atrial fibrillation (HCC) - Primary    Relevant Orders    LIPID PANEL    ALT (SGPT)    AST (SGOT)    TSH W REFLEX TO FREE T4    BASIC METABOLIC PANEL

## 2019-04-11 NOTE — PATIENT INSTRUCTIONS
Try increasing Lasix to take 2 pills in the morning on Monday Wednesday and Friday and continue 1 pill on the other mornings  Monitor weight and leg swelling and if improved we will continue this regimen.   If swelling not improved or feeling dizzy resume p

## 2019-04-19 ENCOUNTER — APPOINTMENT (OUTPATIENT)
Dept: LAB | Facility: HOSPITAL | Age: 84
End: 2019-04-19
Attending: INTERNAL MEDICINE
Payer: MEDICARE

## 2019-04-19 DIAGNOSIS — I10 HYPERTENSION, BENIGN: ICD-10-CM

## 2019-04-19 DIAGNOSIS — I48.20 CHRONIC ATRIAL FIBRILLATION (HCC): ICD-10-CM

## 2019-04-19 PROCEDURE — 80048 BASIC METABOLIC PNL TOTAL CA: CPT

## 2019-04-19 PROCEDURE — 36415 COLL VENOUS BLD VENIPUNCTURE: CPT

## 2019-04-23 ENCOUNTER — TELEPHONE (OUTPATIENT)
Dept: CARDIOLOGY CLINIC | Facility: CLINIC | Age: 84
End: 2019-04-23

## 2019-04-23 ENCOUNTER — ANTI-COAG VISIT (OUTPATIENT)
Dept: INTERNAL MEDICINE CLINIC | Facility: CLINIC | Age: 84
End: 2019-04-23

## 2019-04-23 DIAGNOSIS — I48.91 ATRIAL FIBRILLATION, UNSPECIFIED TYPE (HCC): ICD-10-CM

## 2019-04-23 DIAGNOSIS — Z79.01 LONG TERM (CURRENT) USE OF ANTICOAGULANTS: ICD-10-CM

## 2019-04-23 DIAGNOSIS — I48.91 ATRIAL FIBRILLATION, UNSPECIFIED TYPE (HCC): Primary | ICD-10-CM

## 2019-04-23 DIAGNOSIS — I48.0 PAROXYSMAL ATRIAL FIBRILLATION (HCC): ICD-10-CM

## 2019-04-23 DIAGNOSIS — I48.20 CHRONIC ATRIAL FIBRILLATION (HCC): ICD-10-CM

## 2019-05-08 ENCOUNTER — ANTI-COAG VISIT (OUTPATIENT)
Dept: INTERNAL MEDICINE CLINIC | Facility: CLINIC | Age: 84
End: 2019-05-08

## 2019-05-08 DIAGNOSIS — I48.20 CHRONIC ATRIAL FIBRILLATION (HCC): ICD-10-CM

## 2019-05-08 DIAGNOSIS — Z79.01 LONG TERM (CURRENT) USE OF ANTICOAGULANTS: ICD-10-CM

## 2019-05-08 DIAGNOSIS — I48.91 ATRIAL FIBRILLATION, UNSPECIFIED TYPE (HCC): ICD-10-CM

## 2019-05-20 ENCOUNTER — ANTI-COAG VISIT (OUTPATIENT)
Dept: INTERNAL MEDICINE CLINIC | Facility: CLINIC | Age: 84
End: 2019-05-20

## 2019-05-20 DIAGNOSIS — I48.91 ATRIAL FIBRILLATION, UNSPECIFIED TYPE (HCC): ICD-10-CM

## 2019-05-20 DIAGNOSIS — I48.0 PAROXYSMAL ATRIAL FIBRILLATION (HCC): ICD-10-CM

## 2019-05-20 DIAGNOSIS — Z79.01 LONG TERM (CURRENT) USE OF ANTICOAGULANTS: ICD-10-CM

## 2019-05-20 DIAGNOSIS — I48.20 CHRONIC ATRIAL FIBRILLATION (HCC): ICD-10-CM

## 2019-05-30 ENCOUNTER — APPOINTMENT (OUTPATIENT)
Dept: CARDIOLOGY | Age: 84
End: 2019-05-30
Attending: INTERNAL MEDICINE

## 2019-05-30 PROCEDURE — 93296 REM INTERROG EVL PM/IDS: CPT | Performed by: INTERNAL MEDICINE

## 2019-05-30 PROCEDURE — 93295 DEV INTERROG REMOTE 1/2/MLT: CPT | Performed by: INTERNAL MEDICINE

## 2019-06-05 ENCOUNTER — TELEPHONE (OUTPATIENT)
Dept: INTERNAL MEDICINE CLINIC | Facility: CLINIC | Age: 84
End: 2019-06-05

## 2019-06-05 NOTE — TELEPHONE ENCOUNTER
Letter mailed regarding new change in communication for INR home testing. Will call in one week to follow up.

## 2019-06-06 ENCOUNTER — ANTI-COAG VISIT (OUTPATIENT)
Dept: INTERNAL MEDICINE CLINIC | Facility: CLINIC | Age: 84
End: 2019-06-06

## 2019-06-06 DIAGNOSIS — I48.20 CHRONIC ATRIAL FIBRILLATION (HCC): ICD-10-CM

## 2019-06-06 DIAGNOSIS — Z79.01 LONG TERM (CURRENT) USE OF ANTICOAGULANTS: ICD-10-CM

## 2019-06-06 DIAGNOSIS — I48.91 ATRIAL FIBRILLATION, UNSPECIFIED TYPE (HCC): ICD-10-CM

## 2019-06-10 RX ORDER — AMIODARONE HYDROCHLORIDE 200 MG/1
TABLET ORAL
Qty: 90 TABLET | Refills: 0 | Status: SHIPPED | OUTPATIENT
Start: 2019-06-10 | End: 2019-09-23 | Stop reason: SDUPTHER

## 2019-06-12 ENCOUNTER — TELEPHONE (OUTPATIENT)
Dept: CARDIOLOGY CLINIC | Facility: CLINIC | Age: 84
End: 2019-06-12

## 2019-06-20 ENCOUNTER — ANTI-COAG VISIT (OUTPATIENT)
Dept: INTERNAL MEDICINE CLINIC | Facility: CLINIC | Age: 84
End: 2019-06-20

## 2019-06-20 DIAGNOSIS — Z79.01 LONG TERM (CURRENT) USE OF ANTICOAGULANTS: ICD-10-CM

## 2019-06-20 DIAGNOSIS — I48.20 CHRONIC ATRIAL FIBRILLATION (HCC): ICD-10-CM

## 2019-06-20 DIAGNOSIS — I48.91 ATRIAL FIBRILLATION, UNSPECIFIED TYPE (HCC): ICD-10-CM

## 2019-06-27 ENCOUNTER — ANCILLARY PROCEDURE (OUTPATIENT)
Dept: CARDIOLOGY | Age: 84
End: 2019-06-27
Attending: INTERNAL MEDICINE

## 2019-06-27 ENCOUNTER — ANCILLARY ORDERS (OUTPATIENT)
Dept: CARDIOLOGY | Age: 84
End: 2019-06-27

## 2019-06-27 DIAGNOSIS — Z95.810 ICD (IMPLANTABLE CARDIOVERTER-DEFIBRILLATOR) IN PLACE: ICD-10-CM

## 2019-07-01 ENCOUNTER — MED REC SCAN ONLY (OUTPATIENT)
Dept: INTERNAL MEDICINE CLINIC | Facility: CLINIC | Age: 84
End: 2019-07-01

## 2019-07-01 ENCOUNTER — ANTI-COAG VISIT (OUTPATIENT)
Dept: INTERNAL MEDICINE CLINIC | Facility: CLINIC | Age: 84
End: 2019-07-01

## 2019-07-01 DIAGNOSIS — I48.20 CHRONIC ATRIAL FIBRILLATION (HCC): ICD-10-CM

## 2019-07-01 DIAGNOSIS — I48.91 ATRIAL FIBRILLATION, UNSPECIFIED TYPE (HCC): ICD-10-CM

## 2019-07-01 DIAGNOSIS — Z79.01 LONG TERM (CURRENT) USE OF ANTICOAGULANTS: ICD-10-CM

## 2019-07-01 LAB — INR: 2.4 (ref 2–3)

## 2019-07-01 PROCEDURE — 93793 ANTICOAG MGMT PT WARFARIN: CPT | Performed by: INTERNAL MEDICINE

## 2019-07-16 LAB — INR: 2.5 (ref 2–3)

## 2019-07-17 ENCOUNTER — ANTI-COAG VISIT (OUTPATIENT)
Dept: INTERNAL MEDICINE CLINIC | Facility: CLINIC | Age: 84
End: 2019-07-17

## 2019-07-17 DIAGNOSIS — Z79.01 LONG TERM (CURRENT) USE OF ANTICOAGULANTS: ICD-10-CM

## 2019-07-17 DIAGNOSIS — I48.20 CHRONIC ATRIAL FIBRILLATION (HCC): ICD-10-CM

## 2019-07-17 DIAGNOSIS — I48.91 ATRIAL FIBRILLATION, UNSPECIFIED TYPE (HCC): ICD-10-CM

## 2019-07-26 ENCOUNTER — TELEPHONE (OUTPATIENT)
Dept: CARDIOLOGY CLINIC | Facility: CLINIC | Age: 84
End: 2019-07-26

## 2019-07-26 NOTE — TELEPHONE ENCOUNTER
Pts son/Cornelius calling for pt to request script for rx:furosemide, pt will be out of rx soon due to dosage increase from 20 MG to 40 MG 3x's per week. Pls call at:[phone],thanks.     Current Outpatient Medications:   •  furosemide (LASIX) 20 MG Oral Tab,

## 2019-07-29 RX ORDER — FUROSEMIDE 20 MG/1
TABLET ORAL
Qty: 120 TABLET | Refills: 1 | Status: SHIPPED | OUTPATIENT
Start: 2019-07-29 | End: 2020-01-13

## 2019-07-29 NOTE — TELEPHONE ENCOUNTER
Verified medication dose, creatinine elevated but within 30% range.   Meets protocol , refill order sent  Hypertensive Medications  Protocol Criteria:  · Appointment scheduled with Cardiology in the past 12 months or in the next 3 months  · BMP or CMP in th

## 2019-07-30 ENCOUNTER — ANTI-COAG VISIT (OUTPATIENT)
Dept: INTERNAL MEDICINE CLINIC | Facility: CLINIC | Age: 84
End: 2019-07-30

## 2019-07-30 DIAGNOSIS — Z79.01 LONG TERM (CURRENT) USE OF ANTICOAGULANTS: ICD-10-CM

## 2019-07-30 DIAGNOSIS — I48.20 CHRONIC ATRIAL FIBRILLATION (HCC): ICD-10-CM

## 2019-07-30 DIAGNOSIS — I48.91 ATRIAL FIBRILLATION, UNSPECIFIED TYPE (HCC): ICD-10-CM

## 2019-07-30 LAB — INR: 2.3 (ref 2–3)

## 2019-07-30 PROCEDURE — 93793 ANTICOAG MGMT PT WARFARIN: CPT | Performed by: PHYSICIAN ASSISTANT

## 2019-08-12 LAB — INR: 2.5 (ref 2–3)

## 2019-08-14 ENCOUNTER — MED REC SCAN ONLY (OUTPATIENT)
Dept: INTERNAL MEDICINE CLINIC | Facility: CLINIC | Age: 84
End: 2019-08-14

## 2019-08-14 ENCOUNTER — ANTI-COAG VISIT (OUTPATIENT)
Dept: INTERNAL MEDICINE CLINIC | Facility: CLINIC | Age: 84
End: 2019-08-14

## 2019-08-14 DIAGNOSIS — I48.20 CHRONIC ATRIAL FIBRILLATION (HCC): ICD-10-CM

## 2019-08-14 DIAGNOSIS — Z79.01 LONG TERM (CURRENT) USE OF ANTICOAGULANTS: ICD-10-CM

## 2019-08-14 DIAGNOSIS — I48.91 ATRIAL FIBRILLATION, UNSPECIFIED TYPE (HCC): ICD-10-CM

## 2019-08-14 PROCEDURE — 93793 ANTICOAG MGMT PT WARFARIN: CPT | Performed by: PHYSICIAN ASSISTANT

## 2019-09-03 LAB — INR: 2.9 (ref 2–3)

## 2019-09-05 ENCOUNTER — ANTI-COAG VISIT (OUTPATIENT)
Dept: INTERNAL MEDICINE CLINIC | Facility: CLINIC | Age: 84
End: 2019-09-05

## 2019-09-05 DIAGNOSIS — I48.20 CHRONIC ATRIAL FIBRILLATION (HCC): ICD-10-CM

## 2019-09-05 DIAGNOSIS — I48.91 ATRIAL FIBRILLATION, UNSPECIFIED TYPE (HCC): ICD-10-CM

## 2019-09-05 DIAGNOSIS — Z79.01 LONG TERM (CURRENT) USE OF ANTICOAGULANTS: ICD-10-CM

## 2019-09-05 PROCEDURE — 93296 REM INTERROG EVL PM/IDS: CPT | Performed by: INTERNAL MEDICINE

## 2019-09-05 PROCEDURE — 93793 ANTICOAG MGMT PT WARFARIN: CPT | Performed by: INTERNAL MEDICINE

## 2019-09-05 PROCEDURE — 93295 DEV INTERROG REMOTE 1/2/MLT: CPT | Performed by: INTERNAL MEDICINE

## 2019-09-16 ENCOUNTER — ANTI-COAG VISIT (OUTPATIENT)
Dept: INTERNAL MEDICINE CLINIC | Facility: CLINIC | Age: 84
End: 2019-09-16

## 2019-09-16 DIAGNOSIS — Z79.01 LONG TERM (CURRENT) USE OF ANTICOAGULANTS: ICD-10-CM

## 2019-09-16 DIAGNOSIS — I48.91 ATRIAL FIBRILLATION, UNSPECIFIED TYPE (HCC): ICD-10-CM

## 2019-09-16 DIAGNOSIS — I48.20 CHRONIC ATRIAL FIBRILLATION (HCC): ICD-10-CM

## 2019-09-16 LAB — INR: 2 (ref 2–3)

## 2019-09-16 PROCEDURE — 93793 ANTICOAG MGMT PT WARFARIN: CPT | Performed by: INTERNAL MEDICINE

## 2019-09-24 RX ORDER — AMIODARONE HYDROCHLORIDE 200 MG/1
TABLET ORAL
Qty: 90 TABLET | Refills: 0 | Status: SHIPPED | OUTPATIENT
Start: 2019-09-24 | End: 2019-11-20 | Stop reason: SDUPTHER

## 2019-09-30 ENCOUNTER — ANTI-COAG VISIT (OUTPATIENT)
Dept: INTERNAL MEDICINE CLINIC | Facility: CLINIC | Age: 84
End: 2019-09-30

## 2019-09-30 DIAGNOSIS — I48.20 CHRONIC ATRIAL FIBRILLATION (HCC): ICD-10-CM

## 2019-09-30 DIAGNOSIS — I48.91 ATRIAL FIBRILLATION, UNSPECIFIED TYPE (HCC): ICD-10-CM

## 2019-09-30 DIAGNOSIS — Z79.01 LONG TERM (CURRENT) USE OF ANTICOAGULANTS: ICD-10-CM

## 2019-09-30 LAB — INR: 2.3 (ref 2–3)

## 2019-10-01 ENCOUNTER — ANCILLARY ORDERS (OUTPATIENT)
Dept: CARDIOLOGY | Age: 84
End: 2019-10-01

## 2019-10-01 ENCOUNTER — ANCILLARY PROCEDURE (OUTPATIENT)
Dept: CARDIOLOGY | Age: 84
End: 2019-10-01
Attending: INTERNAL MEDICINE

## 2019-10-01 DIAGNOSIS — Z95.810 ICD (IMPLANTABLE CARDIOVERTER-DEFIBRILLATOR) IN PLACE: ICD-10-CM

## 2019-10-01 PROCEDURE — X1114 CARDIAC DEVICE HOME CHECK - REMOTE UNSCHEDULED: HCPCS | Performed by: INTERNAL MEDICINE

## 2019-10-15 ENCOUNTER — OFFICE VISIT (OUTPATIENT)
Dept: CARDIOLOGY CLINIC | Facility: CLINIC | Age: 84
End: 2019-10-15
Payer: MEDICARE

## 2019-10-15 ENCOUNTER — ANTI-COAG VISIT (OUTPATIENT)
Dept: INTERNAL MEDICINE CLINIC | Facility: CLINIC | Age: 84
End: 2019-10-15

## 2019-10-15 VITALS
WEIGHT: 170 LBS | DIASTOLIC BLOOD PRESSURE: 76 MMHG | RESPIRATION RATE: 18 BRPM | SYSTOLIC BLOOD PRESSURE: 150 MMHG | BODY MASS INDEX: 31.28 KG/M2 | HEIGHT: 62 IN | HEART RATE: 64 BPM

## 2019-10-15 DIAGNOSIS — I42.9 PRIMARY CARDIOMYOPATHY (HCC): ICD-10-CM

## 2019-10-15 DIAGNOSIS — Z79.01 LONG TERM (CURRENT) USE OF ANTICOAGULANTS: ICD-10-CM

## 2019-10-15 DIAGNOSIS — I48.91 ATRIAL FIBRILLATION, UNSPECIFIED TYPE (HCC): ICD-10-CM

## 2019-10-15 DIAGNOSIS — I10 HYPERTENSION, BENIGN: ICD-10-CM

## 2019-10-15 DIAGNOSIS — I48.20 CHRONIC ATRIAL FIBRILLATION (HCC): ICD-10-CM

## 2019-10-15 DIAGNOSIS — I48.91 ATRIAL FIBRILLATION, UNSPECIFIED TYPE (HCC): Primary | ICD-10-CM

## 2019-10-15 PROCEDURE — 99214 OFFICE O/P EST MOD 30 MIN: CPT | Performed by: INTERNAL MEDICINE

## 2019-10-15 NOTE — PROGRESS NOTES
[address] NOTE    Tamar Ingram is a 80year old female. Patient presents with:   Follow - Up  Atrial Fibrillation    HPI:   This is a pleasant 80year old female with history of atrial fibrillation hypertension elevated cholesterol prior c Tab, Take 1 tablet by mouth 3 (three) times daily as needed. , Disp: , Rfl:   amoxicillin 500 MG Oral Cap, Take 500 mg by mouth.  Takes when going for dental procedures , Disp: , Rfl: 0  MetroNIDAZOLE 0.75 % External Gel, , Disp: , Rfl: 0       Past Medical ([facility] [address] - Primary    Relevant Orders    ALT (SGPT)    AST (SGOT)    BASIC METABOLIC PANEL (8)    TSH W REFLEX TO FREE T4    LIPID PANEL          In conclusion, this pleasant 80year old female with prior cardiomyopathy MR and A. fib who is doing well and con

## 2019-10-29 ENCOUNTER — ANTI-COAG VISIT (OUTPATIENT)
Dept: INTERNAL MEDICINE CLINIC | Facility: CLINIC | Age: 84
End: 2019-10-29

## 2019-10-29 DIAGNOSIS — Z79.01 LONG TERM (CURRENT) USE OF ANTICOAGULANTS: ICD-10-CM

## 2019-10-29 DIAGNOSIS — I48.91 ATRIAL FIBRILLATION, UNSPECIFIED TYPE (HCC): ICD-10-CM

## 2019-10-29 DIAGNOSIS — I48.20 CHRONIC ATRIAL FIBRILLATION (HCC): ICD-10-CM

## 2019-10-29 PROCEDURE — 93793 ANTICOAG MGMT PT WARFARIN: CPT | Performed by: INTERNAL MEDICINE

## 2019-11-12 ENCOUNTER — ANTI-COAG VISIT (OUTPATIENT)
Dept: INTERNAL MEDICINE CLINIC | Facility: CLINIC | Age: 84
End: 2019-11-12

## 2019-11-12 DIAGNOSIS — Z79.01 LONG TERM (CURRENT) USE OF ANTICOAGULANTS: ICD-10-CM

## 2019-11-12 DIAGNOSIS — I48.91 ATRIAL FIBRILLATION, UNSPECIFIED TYPE (HCC): ICD-10-CM

## 2019-11-12 DIAGNOSIS — I48.20 CHRONIC ATRIAL FIBRILLATION (HCC): ICD-10-CM

## 2019-11-16 ENCOUNTER — LAB ENCOUNTER (OUTPATIENT)
Dept: LAB | Facility: HOSPITAL | Age: 84
End: 2019-11-16
Attending: INTERNAL MEDICINE
Payer: MEDICARE

## 2019-11-16 DIAGNOSIS — I48.91 ATRIAL FIBRILLATION, UNSPECIFIED TYPE (HCC): ICD-10-CM

## 2019-11-16 DIAGNOSIS — I10 HYPERTENSION, BENIGN: ICD-10-CM

## 2019-11-16 DIAGNOSIS — I42.9 PRIMARY CARDIOMYOPATHY (HCC): ICD-10-CM

## 2019-11-16 PROCEDURE — 36415 COLL VENOUS BLD VENIPUNCTURE: CPT

## 2019-11-16 PROCEDURE — 84439 ASSAY OF FREE THYROXINE: CPT

## 2019-11-16 PROCEDURE — 80061 LIPID PANEL: CPT

## 2019-11-16 PROCEDURE — 84460 ALANINE AMINO (ALT) (SGPT): CPT

## 2019-11-16 PROCEDURE — 80048 BASIC METABOLIC PNL TOTAL CA: CPT

## 2019-11-16 PROCEDURE — 84443 ASSAY THYROID STIM HORMONE: CPT

## 2019-11-16 PROCEDURE — 84450 TRANSFERASE (AST) (SGOT): CPT

## 2019-11-19 ENCOUNTER — TELEPHONE (OUTPATIENT)
Dept: CARDIOLOGY CLINIC | Facility: CLINIC | Age: 84
End: 2019-11-19

## 2019-11-19 ENCOUNTER — CLINICAL ABSTRACT (OUTPATIENT)
Dept: CARDIOLOGY | Age: 84
End: 2019-11-19

## 2019-11-19 NOTE — TELEPHONE ENCOUNTER
Notes recorded by KIN Wilkinson on 11/18/2019 at 8:26 AM CST  Results reviewed and are stable, continue present management.  Overall stable amio labs recheck in 6 months    Spoke with sonFinn  (HIPAA verified) that labs were stable

## 2019-11-21 RX ORDER — AMIODARONE HYDROCHLORIDE 200 MG/1
TABLET ORAL
Qty: 30 TABLET | Refills: 0 | Status: SHIPPED | OUTPATIENT
Start: 2019-11-21 | End: 2019-11-22 | Stop reason: SDUPTHER

## 2019-11-22 ENCOUNTER — OFFICE VISIT (OUTPATIENT)
Dept: CARDIOLOGY | Age: 84
End: 2019-11-22

## 2019-11-22 DIAGNOSIS — Z79.899 ENCOUNTER FOR LONG-TERM (CURRENT) DRUG USE: Primary | ICD-10-CM

## 2019-11-22 PROCEDURE — 93000 ELECTROCARDIOGRAM COMPLETE: CPT | Performed by: INTERNAL MEDICINE

## 2019-11-22 RX ORDER — AMIODARONE HYDROCHLORIDE 200 MG/1
200 TABLET ORAL DAILY
Qty: 30 TABLET | Refills: 6 | Status: SHIPPED | OUTPATIENT
Start: 2019-11-22 | End: 2020-01-01

## 2019-11-25 DIAGNOSIS — Z79.899 ENCOUNTER FOR LONG-TERM (CURRENT) DRUG USE: ICD-10-CM

## 2019-11-26 ENCOUNTER — ANTI-COAG VISIT (OUTPATIENT)
Dept: INTERNAL MEDICINE CLINIC | Facility: CLINIC | Age: 84
End: 2019-11-26

## 2019-11-26 DIAGNOSIS — I48.20 CHRONIC ATRIAL FIBRILLATION (HCC): ICD-10-CM

## 2019-11-26 DIAGNOSIS — Z79.01 LONG TERM (CURRENT) USE OF ANTICOAGULANTS: ICD-10-CM

## 2019-11-26 DIAGNOSIS — I48.91 ATRIAL FIBRILLATION, UNSPECIFIED TYPE (HCC): ICD-10-CM

## 2019-11-26 PROCEDURE — 93793 ANTICOAG MGMT PT WARFARIN: CPT | Performed by: INTERNAL MEDICINE

## 2019-12-09 ENCOUNTER — ANTI-COAG VISIT (OUTPATIENT)
Dept: INTERNAL MEDICINE CLINIC | Facility: CLINIC | Age: 84
End: 2019-12-09

## 2019-12-09 DIAGNOSIS — Z79.01 LONG TERM (CURRENT) USE OF ANTICOAGULANTS: ICD-10-CM

## 2019-12-09 DIAGNOSIS — I48.91 ATRIAL FIBRILLATION, UNSPECIFIED TYPE (HCC): ICD-10-CM

## 2019-12-09 DIAGNOSIS — I48.20 CHRONIC ATRIAL FIBRILLATION (HCC): ICD-10-CM

## 2019-12-12 PROCEDURE — 93295 DEV INTERROG REMOTE 1/2/MLT: CPT | Performed by: INTERNAL MEDICINE

## 2019-12-12 PROCEDURE — 93296 REM INTERROG EVL PM/IDS: CPT | Performed by: INTERNAL MEDICINE

## 2019-12-13 ENCOUNTER — TELEPHONE (OUTPATIENT)
Dept: CARDIOLOGY | Age: 84
End: 2019-12-13

## 2019-12-13 ENCOUNTER — ANCILLARY PROCEDURE (OUTPATIENT)
Dept: CARDIOLOGY | Age: 84
End: 2019-12-13
Attending: INTERNAL MEDICINE

## 2019-12-13 DIAGNOSIS — Z95.810 ICD (IMPLANTABLE CARDIOVERTER-DEFIBRILLATOR) IN PLACE: ICD-10-CM

## 2019-12-16 ENCOUNTER — TELEPHONE (OUTPATIENT)
Dept: CARDIOLOGY CLINIC | Facility: CLINIC | Age: 84
End: 2019-12-16

## 2019-12-23 ENCOUNTER — ANTI-COAG VISIT (OUTPATIENT)
Dept: INTERNAL MEDICINE CLINIC | Facility: CLINIC | Age: 84
End: 2019-12-23

## 2019-12-23 DIAGNOSIS — I48.20 CHRONIC ATRIAL FIBRILLATION (HCC): ICD-10-CM

## 2019-12-23 DIAGNOSIS — I48.91 ATRIAL FIBRILLATION, UNSPECIFIED TYPE (HCC): ICD-10-CM

## 2019-12-23 DIAGNOSIS — Z79.01 LONG TERM (CURRENT) USE OF ANTICOAGULANTS: ICD-10-CM

## 2019-12-23 PROCEDURE — 93793 ANTICOAG MGMT PT WARFARIN: CPT | Performed by: INTERNAL MEDICINE

## 2020-01-01 ENCOUNTER — TELEPHONE (OUTPATIENT)
Dept: CARDIOLOGY | Age: 85
End: 2020-01-01

## 2020-01-01 ENCOUNTER — APPOINTMENT (OUTPATIENT)
Dept: CV DIAGNOSTICS | Facility: HOSPITAL | Age: 85
DRG: 064 | End: 2020-01-01
Attending: Other
Payer: MEDICARE

## 2020-01-01 ENCOUNTER — ANTI-COAG VISIT (OUTPATIENT)
Dept: INTERNAL MEDICINE CLINIC | Facility: CLINIC | Age: 85
End: 2020-01-01

## 2020-01-01 ENCOUNTER — TELEPHONE (OUTPATIENT)
Dept: INTERNAL MEDICINE CLINIC | Facility: CLINIC | Age: 85
End: 2020-01-01

## 2020-01-01 ENCOUNTER — APPOINTMENT (OUTPATIENT)
Dept: GENERAL RADIOLOGY | Facility: HOSPITAL | Age: 85
DRG: 064 | End: 2020-01-01
Attending: EMERGENCY MEDICINE
Payer: MEDICARE

## 2020-01-01 ENCOUNTER — HOSPITAL ENCOUNTER (OUTPATIENT)
Facility: HOSPITAL | Age: 85
Setting detail: OBSERVATION
Discharge: NURSING, REHAB OR LONG-TERM CARE | End: 2020-01-01
Admitting: HOSPITALIST
Payer: MEDICARE

## 2020-01-01 ENCOUNTER — ANCILLARY PROCEDURE (OUTPATIENT)
Dept: CARDIOLOGY | Age: 85
End: 2020-01-01
Attending: INTERNAL MEDICINE

## 2020-01-01 ENCOUNTER — LAB ENCOUNTER (OUTPATIENT)
Dept: LAB | Facility: HOSPITAL | Age: 85
End: 2020-01-01
Attending: INTERNAL MEDICINE
Payer: MEDICARE

## 2020-01-01 ENCOUNTER — APPOINTMENT (OUTPATIENT)
Dept: CT IMAGING | Facility: HOSPITAL | Age: 85
End: 2020-01-01
Attending: EMERGENCY MEDICINE
Payer: MEDICARE

## 2020-01-01 ENCOUNTER — OFFICE VISIT (OUTPATIENT)
Dept: CARDIOLOGY | Age: 85
End: 2020-01-01

## 2020-01-01 ENCOUNTER — APPOINTMENT (OUTPATIENT)
Dept: CARDIOLOGY | Age: 85
End: 2020-01-01
Attending: INTERNAL MEDICINE

## 2020-01-01 ENCOUNTER — TELEPHONE (OUTPATIENT)
Dept: CARDIOLOGY CLINIC | Facility: CLINIC | Age: 85
End: 2020-01-01

## 2020-01-01 ENCOUNTER — SNF DISCHARGE (OUTPATIENT)
Dept: INTERNAL MEDICINE CLINIC | Facility: SKILLED NURSING FACILITY | Age: 85
End: 2020-01-01

## 2020-01-01 ENCOUNTER — APPOINTMENT (OUTPATIENT)
Dept: CT IMAGING | Facility: HOSPITAL | Age: 85
DRG: 064 | End: 2020-01-01
Attending: EMERGENCY MEDICINE
Payer: MEDICARE

## 2020-01-01 ENCOUNTER — ANCILLARY ORDERS (OUTPATIENT)
Dept: CARDIOLOGY | Age: 85
End: 2020-01-01

## 2020-01-01 ENCOUNTER — SNF VISIT (OUTPATIENT)
Dept: INTERNAL MEDICINE CLINIC | Facility: SKILLED NURSING FACILITY | Age: 85
End: 2020-01-01

## 2020-01-01 ENCOUNTER — VIRTUAL PHONE E/M (OUTPATIENT)
Dept: CARDIOLOGY CLINIC | Facility: CLINIC | Age: 85
End: 2020-01-01
Payer: MEDICARE

## 2020-01-01 ENCOUNTER — INITIAL APN SNF VISIT (OUTPATIENT)
Dept: INTERNAL MEDICINE CLINIC | Facility: SKILLED NURSING FACILITY | Age: 85
End: 2020-01-01

## 2020-01-01 ENCOUNTER — APPOINTMENT (OUTPATIENT)
Dept: CT IMAGING | Facility: HOSPITAL | Age: 85
End: 2020-01-01
Payer: MEDICARE

## 2020-01-01 VITALS
BODY MASS INDEX: 30 KG/M2 | SYSTOLIC BLOOD PRESSURE: 120 MMHG | HEART RATE: 72 BPM | OXYGEN SATURATION: 98 % | RESPIRATION RATE: 20 BRPM | WEIGHT: 151 LBS | TEMPERATURE: 98 F | DIASTOLIC BLOOD PRESSURE: 72 MMHG

## 2020-01-01 VITALS
HEART RATE: 66 BPM | DIASTOLIC BLOOD PRESSURE: 60 MMHG | OXYGEN SATURATION: 98 % | WEIGHT: 160 LBS | BODY MASS INDEX: 34.52 KG/M2 | SYSTOLIC BLOOD PRESSURE: 108 MMHG | HEIGHT: 57 IN

## 2020-01-01 VITALS
SYSTOLIC BLOOD PRESSURE: 122 MMHG | HEART RATE: 60 BPM | WEIGHT: 152 LBS | DIASTOLIC BLOOD PRESSURE: 64 MMHG | BODY MASS INDEX: 30.64 KG/M2 | HEIGHT: 59 IN

## 2020-01-01 VITALS
OXYGEN SATURATION: 95 % | DIASTOLIC BLOOD PRESSURE: 74 MMHG | SYSTOLIC BLOOD PRESSURE: 144 MMHG | BODY MASS INDEX: 30.48 KG/M2 | TEMPERATURE: 98 F | WEIGHT: 151.19 LBS | HEART RATE: 77 BPM | RESPIRATION RATE: 18 BRPM | HEIGHT: 59.02 IN

## 2020-01-01 VITALS — DIASTOLIC BLOOD PRESSURE: 64 MMHG | SYSTOLIC BLOOD PRESSURE: 122 MMHG | HEART RATE: 60 BPM

## 2020-01-01 VITALS
RESPIRATION RATE: 20 BRPM | SYSTOLIC BLOOD PRESSURE: 112 MMHG | WEIGHT: 151 LBS | DIASTOLIC BLOOD PRESSURE: 64 MMHG | BODY MASS INDEX: 30 KG/M2 | OXYGEN SATURATION: 98 % | HEART RATE: 66 BPM | TEMPERATURE: 98 F

## 2020-01-01 VITALS
SYSTOLIC BLOOD PRESSURE: 128 MMHG | DIASTOLIC BLOOD PRESSURE: 53 MMHG | HEART RATE: 75 BPM | BODY MASS INDEX: 30 KG/M2 | OXYGEN SATURATION: 98 % | RESPIRATION RATE: 20 BRPM | WEIGHT: 151 LBS | TEMPERATURE: 98 F

## 2020-01-01 VITALS
DIASTOLIC BLOOD PRESSURE: 62 MMHG | BODY MASS INDEX: 30 KG/M2 | TEMPERATURE: 98 F | WEIGHT: 151 LBS | OXYGEN SATURATION: 98 % | SYSTOLIC BLOOD PRESSURE: 114 MMHG | RESPIRATION RATE: 20 BRPM | HEART RATE: 75 BPM

## 2020-01-01 DIAGNOSIS — I48.20 CHRONIC ATRIAL FIBRILLATION (HCC): ICD-10-CM

## 2020-01-01 DIAGNOSIS — Z95.810 ICD (IMPLANTABLE CARDIOVERTER-DEFIBRILLATOR) IN PLACE: ICD-10-CM

## 2020-01-01 DIAGNOSIS — Z79.01 LONG TERM (CURRENT) USE OF ANTICOAGULANTS: ICD-10-CM

## 2020-01-01 DIAGNOSIS — I48.91 ATRIAL FIBRILLATION, UNSPECIFIED TYPE (HCC): ICD-10-CM

## 2020-01-01 DIAGNOSIS — I42.9 PRIMARY CARDIOMYOPATHY (HCC): ICD-10-CM

## 2020-01-01 DIAGNOSIS — E78.00 HYPERCHOLESTEROLEMIA: ICD-10-CM

## 2020-01-01 DIAGNOSIS — S12.190D: ICD-10-CM

## 2020-01-01 DIAGNOSIS — I48.21 PERMANENT ATRIAL FIBRILLATION (CMD): ICD-10-CM

## 2020-01-01 DIAGNOSIS — I50.22 CHRONIC SYSTOLIC CONGESTIVE HEART FAILURE (CMD): Primary | ICD-10-CM

## 2020-01-01 DIAGNOSIS — S12.9XXD COMPRESSION FRACTURE OF CERVICAL VERTEBRA WITH ROUTINE HEALING, UNSPECIFIED CERVICAL VERTEBRAL LEVEL: ICD-10-CM

## 2020-01-01 DIAGNOSIS — Z91.81 AT MODERATE RISK FOR FALL: ICD-10-CM

## 2020-01-01 DIAGNOSIS — R51.9 ACUTE INTRACTABLE HEADACHE, UNSPECIFIED HEADACHE TYPE: ICD-10-CM

## 2020-01-01 DIAGNOSIS — R42 DIZZINESS: ICD-10-CM

## 2020-01-01 DIAGNOSIS — S12.9XXS CLOSED FRACTURE OF MULTIPLE CERVICAL VERTEBRAE, SEQUELA: ICD-10-CM

## 2020-01-01 DIAGNOSIS — I10 ESSENTIAL HYPERTENSION: ICD-10-CM

## 2020-01-01 DIAGNOSIS — I48.91 ATRIAL FIBRILLATION, UNSPECIFIED TYPE (HCC): Primary | ICD-10-CM

## 2020-01-01 DIAGNOSIS — Z79.899 ENCOUNTER FOR LONG-TERM (CURRENT) DRUG USE: ICD-10-CM

## 2020-01-01 DIAGNOSIS — I10 HYPERTENSION, BENIGN: ICD-10-CM

## 2020-01-01 DIAGNOSIS — Z45.02 ENCOUNTER FOR ASSESSMENT OF IMPLANTABLE CARDIOVERTER-DEFIBRILLATOR (ICD): ICD-10-CM

## 2020-01-01 DIAGNOSIS — S12.100A CLOSED ODONTOID FRACTURE, INITIAL ENCOUNTER (HCC): ICD-10-CM

## 2020-01-01 DIAGNOSIS — I48.0 PAROXYSMAL ATRIAL FIBRILLATION (HCC): ICD-10-CM

## 2020-01-01 DIAGNOSIS — S09.8XXA BLUNT HEAD INJURY, INITIAL ENCOUNTER: Primary | ICD-10-CM

## 2020-01-01 DIAGNOSIS — R53.1 GENERALIZED WEAKNESS: ICD-10-CM

## 2020-01-01 DIAGNOSIS — R11.2 INTRACTABLE NAUSEA AND VOMITING: ICD-10-CM
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
## 2020-01-01 LAB
ALT SERPL-CCNC: 44 U/L (ref 13–56)
ANION GAP SERPL CALC-SCNC: 7 MMOL/L (ref 0–18)
AST SERPL-CCNC: 62 U/L (ref 15–37)
BUN BLD-MCNC: 29 MG/DL (ref 7–18)
BUN SERPL-MCNC: 29 MG/DL
BUN/CREAT SERPL: 20.9 (ref 10–20)
CALCIUM BLD-MCNC: 8.3 MG/DL (ref 8.5–10.1)
CALCIUM SERPL-MCNC: 8.3 MG/DL
CHLORIDE SERPL-SCNC: 101 MMOL/L
CHLORIDE SERPL-SCNC: 101 MMOL/L (ref 98–112)
CO2 SERPL-SCNC: 27 MMOL/L
CO2 SERPL-SCNC: 27 MMOL/L (ref 21–32)
CREAT BLD-MCNC: 1.39 MG/DL (ref 0.55–1.02)
CREAT SERPL-MCNC: 20.9 MG/DL
GLUCOSE BLD-MCNC: 94 MG/DL (ref 70–99)
GLUCOSE SERPL-MCNC: 94 MG/DL
INR: 2.2 (ref 0.8–1.2)
INR: 2.3 (ref 0.8–1.2)
INR: 2.4 (ref 0.8–1.2)
INR: 2.4 (ref 0.8–1.2)
INR: 2.6 (ref 0.8–1.2)
INR: 3.1 (ref 0.8–1.2)
OSMOLALITY SERPL CALC.SUM OF ELEC: 286 MOSM/KG (ref 275–295)
PATIENT FASTING Y/N/NP: NO
POTASSIUM SERPL-SCNC: 4.2 MMOL/L
POTASSIUM SERPL-SCNC: 4.2 MMOL/L (ref 3.5–5.1)
SODIUM SERPL-SCNC: 135 MMOL/L
SODIUM SERPL-SCNC: 135 MMOL/L (ref 136–145)
T4 FREE SERPL-MCNC: 1.6 NG/DL (ref 0.8–1.7)
TSH SERPL-ACNC: 4.49 MCUNITS/ML
TSI SER-ACNC: 4.49 MIU/ML (ref 0.36–3.74)

## 2020-01-01 PROCEDURE — 84450 TRANSFERASE (AST) (SGOT): CPT

## 2020-01-01 PROCEDURE — 99308 SBSQ NF CARE LOW MDM 20: CPT | Performed by: NURSE PRACTITIONER

## 2020-01-01 PROCEDURE — 93296 REM INTERROG EVL PM/IDS: CPT | Performed by: INTERNAL MEDICINE

## 2020-01-01 PROCEDURE — 3078F DIAST BP <80 MM HG: CPT | Performed by: NURSE PRACTITIONER

## 2020-01-01 PROCEDURE — 1123F ACP DISCUSS/DSCN MKR DOCD: CPT | Performed by: NURSE PRACTITIONER

## 2020-01-01 PROCEDURE — 74176 CT ABD & PELVIS W/O CONTRAST: CPT | Performed by: EMERGENCY MEDICINE

## 2020-01-01 PROCEDURE — 93793 ANTICOAG MGMT PT WARFARIN: CPT | Performed by: INTERNAL MEDICINE

## 2020-01-01 PROCEDURE — 70450 CT HEAD/BRAIN W/O DYE: CPT | Performed by: EMERGENCY MEDICINE

## 2020-01-01 PROCEDURE — 84443 ASSAY THYROID STIM HORMONE: CPT

## 2020-01-01 PROCEDURE — 93295 DEV INTERROG REMOTE 1/2/MLT: CPT | Performed by: INTERNAL MEDICINE

## 2020-01-01 PROCEDURE — 93793 ANTICOAG MGMT PT WARFARIN: CPT | Performed by: NURSE PRACTITIONER

## 2020-01-01 PROCEDURE — 99442 PHONE E/M BY PHYS 11-20 MIN: CPT | Performed by: INTERNAL MEDICINE

## 2020-01-01 PROCEDURE — 99217 OBSERVATION CARE DISCHARGE: CPT | Performed by: HOSPITALIST

## 2020-01-01 PROCEDURE — 70498 CT ANGIOGRAPHY NECK: CPT | Performed by: EMERGENCY MEDICINE

## 2020-01-01 PROCEDURE — X1114 CARDIAC DEVICE HOME CHECK - REMOTE UNSCHEDULED: HCPCS | Performed by: INTERNAL MEDICINE

## 2020-01-01 PROCEDURE — 99225 SUBSEQUENT OBSERVATION CARE: CPT | Performed by: HOSPITALIST

## 2020-01-01 PROCEDURE — 93000 ELECTROCARDIOGRAM COMPLETE: CPT | Performed by: INTERNAL MEDICINE

## 2020-01-01 PROCEDURE — 93282 PRGRMG EVAL IMPLANTABLE DFB: CPT | Performed by: INTERNAL MEDICINE

## 2020-01-01 PROCEDURE — 71045 X-RAY EXAM CHEST 1 VIEW: CPT | Performed by: EMERGENCY MEDICINE

## 2020-01-01 PROCEDURE — 99220 INITIAL OBSERVATION CARE,LEVL III: CPT | Performed by: HOSPITALIST

## 2020-01-01 PROCEDURE — 80048 BASIC METABOLIC PNL TOTAL CA: CPT

## 2020-01-01 PROCEDURE — 70496 CT ANGIOGRAPHY HEAD: CPT | Performed by: EMERGENCY MEDICINE

## 2020-01-01 PROCEDURE — 84460 ALANINE AMINO (ALT) (SGPT): CPT

## 2020-01-01 PROCEDURE — 99226 SUBSEQUENT OBSERVATION CARE: CPT | Performed by: HOSPITALIST

## 2020-01-01 PROCEDURE — 3074F SYST BP LT 130 MM HG: CPT | Performed by: NURSE PRACTITIONER

## 2020-01-01 PROCEDURE — 93306 TTE W/DOPPLER COMPLETE: CPT | Performed by: OTHER

## 2020-01-01 PROCEDURE — 99214 OFFICE O/P EST MOD 30 MIN: CPT | Performed by: INTERNAL MEDICINE

## 2020-01-01 PROCEDURE — 1111F DSCHRG MED/CURRENT MED MERGE: CPT | Performed by: NURSE PRACTITIONER

## 2020-01-01 PROCEDURE — 84439 ASSAY OF FREE THYROXINE: CPT

## 2020-01-01 PROCEDURE — 99310 SBSQ NF CARE HIGH MDM 45: CPT | Performed by: NURSE PRACTITIONER

## 2020-01-01 PROCEDURE — 36415 COLL VENOUS BLD VENIPUNCTURE: CPT

## 2020-01-01 PROCEDURE — 70450 CT HEAD/BRAIN W/O DYE: CPT

## 2020-01-01 PROCEDURE — 72125 CT NECK SPINE W/O DYE: CPT | Performed by: EMERGENCY MEDICINE

## 2020-01-01 RX ORDER — ACETAMINOPHEN 325 MG/1
650 TABLET ORAL EVERY 6 HOURS PRN
Refills: 0 | Status: ON HOLD | COMMUNITY
Start: 2020-01-01 | End: 2021-01-01

## 2020-01-01 RX ORDER — ALPRAZOLAM 0.25 MG/1
0.25 TABLET ORAL 3 TIMES DAILY PRN
Status: DISCONTINUED | OUTPATIENT
Start: 2020-01-01 | End: 2020-01-01

## 2020-01-01 RX ORDER — ACETAMINOPHEN 325 MG/1
650 TABLET ORAL EVERY 6 HOURS PRN
Status: DISCONTINUED | OUTPATIENT
Start: 2020-01-01 | End: 2020-01-01

## 2020-01-01 RX ORDER — LACTULOSE 10 G/15ML
10 SOLUTION ORAL 2 TIMES DAILY
Refills: 0 | Status: ON HOLD | COMMUNITY
Start: 2020-01-01 | End: 2021-01-01

## 2020-01-01 RX ORDER — PROCHLORPERAZINE EDISYLATE 5 MG/ML
10 INJECTION INTRAMUSCULAR; INTRAVENOUS EVERY 6 HOURS PRN
Status: DISCONTINUED | OUTPATIENT
Start: 2020-01-01 | End: 2020-01-01

## 2020-01-01 RX ORDER — OLANZAPINE 10 MG/1
INJECTION, POWDER, LYOPHILIZED, FOR SOLUTION INTRAMUSCULAR
Status: DISCONTINUED
Start: 2020-01-01 | End: 2020-01-01 | Stop reason: WASHOUT

## 2020-01-01 RX ORDER — METOCLOPRAMIDE HYDROCHLORIDE 5 MG/ML
5 INJECTION INTRAMUSCULAR; INTRAVENOUS ONCE
Status: COMPLETED | OUTPATIENT
Start: 2020-01-01 | End: 2020-01-01

## 2020-01-01 RX ORDER — MORPHINE SULFATE 4 MG/ML
4 INJECTION, SOLUTION INTRAMUSCULAR; INTRAVENOUS EVERY 2 HOUR PRN
Status: DISCONTINUED | OUTPATIENT
Start: 2020-01-01 | End: 2020-01-01

## 2020-01-01 RX ORDER — ONDANSETRON 2 MG/ML
4 INJECTION INTRAMUSCULAR; INTRAVENOUS EVERY 6 HOURS PRN
Status: DISCONTINUED | OUTPATIENT
Start: 2020-01-01 | End: 2020-01-01

## 2020-01-01 RX ORDER — POLYETHYLENE GLYCOL 3350 17 G/17G
17 POWDER, FOR SOLUTION ORAL DAILY
Status: DISCONTINUED | OUTPATIENT
Start: 2020-01-01 | End: 2020-01-01

## 2020-01-01 RX ORDER — ESTRADIOL 0.1 MG/G
CREAM VAGINAL
COMMUNITY
Start: 2018-03-05 | End: 2020-01-01

## 2020-01-01 RX ORDER — AMIODARONE HYDROCHLORIDE 200 MG/1
200 TABLET ORAL DAILY
Qty: 90 TABLET | Refills: 0 | Status: SHIPPED | OUTPATIENT
Start: 2020-01-01 | End: 2021-01-01 | Stop reason: DRUGHIGH

## 2020-01-01 RX ORDER — BISACODYL 10 MG
10 SUPPOSITORY, RECTAL RECTAL
Status: DISCONTINUED | OUTPATIENT
Start: 2020-01-01 | End: 2020-01-01

## 2020-01-01 RX ORDER — WARFARIN SODIUM 1 MG/1
TABLET ORAL
Status: ON HOLD | COMMUNITY
Start: 2020-01-01 | End: 2020-01-01

## 2020-01-01 RX ORDER — MORPHINE SULFATE 4 MG/ML
2 INJECTION, SOLUTION INTRAMUSCULAR; INTRAVENOUS ONCE
Status: COMPLETED | OUTPATIENT
Start: 2020-01-01 | End: 2020-01-01

## 2020-01-01 RX ORDER — ZOLPIDEM TARTRATE 5 MG/1
5 TABLET ORAL NIGHTLY PRN
Status: DISCONTINUED | OUTPATIENT
Start: 2020-01-01 | End: 2020-01-01

## 2020-01-01 RX ORDER — MORPHINE SULFATE 2 MG/ML
2 INJECTION, SOLUTION INTRAMUSCULAR; INTRAVENOUS EVERY 2 HOUR PRN
Status: DISCONTINUED | OUTPATIENT
Start: 2020-01-01 | End: 2020-01-01

## 2020-01-01 RX ORDER — PANTOPRAZOLE SODIUM 40 MG/1
40 TABLET, DELAYED RELEASE ORAL
Status: DISCONTINUED | OUTPATIENT
Start: 2020-01-01 | End: 2020-01-01

## 2020-01-01 RX ORDER — ALPRAZOLAM 0.25 MG/1
0.25 TABLET ORAL 3 TIMES DAILY PRN
Qty: 20 TABLET | Refills: 0 | Status: ON HOLD | OUTPATIENT
Start: 2020-01-01 | End: 2020-01-01

## 2020-01-01 RX ORDER — FENOFIBRATE 67 MG/1
67 CAPSULE ORAL
Status: DISCONTINUED | OUTPATIENT
Start: 2020-01-01 | End: 2020-01-01

## 2020-01-01 RX ORDER — CARVEDILOL 12.5 MG/1
12.5 TABLET ORAL 2 TIMES DAILY WITH MEALS
Status: DISCONTINUED | OUTPATIENT
Start: 2020-01-01 | End: 2020-01-01

## 2020-01-01 RX ORDER — HYDROCODONE BITARTRATE AND ACETAMINOPHEN 5; 325 MG/1; MG/1
1 TABLET ORAL EVERY 6 HOURS PRN
Qty: 20 TABLET | Refills: 0 | Status: ON HOLD | OUTPATIENT
Start: 2020-01-01 | End: 2020-01-01

## 2020-01-01 RX ORDER — DIAPER,BRIEF,INFANT-TODD,DISP
EACH MISCELLANEOUS
Status: ON HOLD | COMMUNITY
Start: 2020-03-23 | End: 2020-01-01 | Stop reason: ALTCHOICE

## 2020-01-01 RX ORDER — AMIODARONE HYDROCHLORIDE 200 MG/1
200 TABLET ORAL DAILY
Qty: 90 TABLET | Refills: 0 | Status: SHIPPED | OUTPATIENT
Start: 2020-01-01 | End: 2020-01-01

## 2020-01-01 RX ORDER — HYDROCODONE BITARTRATE AND ACETAMINOPHEN 5; 325 MG/1; MG/1
1 TABLET ORAL EVERY 6 HOURS PRN
Status: DISCONTINUED | OUTPATIENT
Start: 2020-01-01 | End: 2020-01-01

## 2020-01-01 RX ORDER — WARFARIN SODIUM 1 MG/1
1 TABLET ORAL NIGHTLY
Status: DISCONTINUED | OUTPATIENT
Start: 2020-01-01 | End: 2020-01-01

## 2020-01-01 RX ORDER — LACTULOSE 10 G/15ML
20 SOLUTION ORAL 3 TIMES DAILY
Status: DISCONTINUED | OUTPATIENT
Start: 2020-01-01 | End: 2020-01-01

## 2020-01-01 RX ORDER — DILTIAZEM HYDROCHLORIDE 180 MG/1
180 CAPSULE, EXTENDED RELEASE ORAL DAILY
COMMUNITY
Start: 2020-04-13

## 2020-01-01 RX ORDER — ACETAMINOPHEN 500 MG
1000 TABLET ORAL ONCE
Status: COMPLETED | OUTPATIENT
Start: 2020-01-01 | End: 2020-01-01

## 2020-01-01 RX ORDER — METOCLOPRAMIDE HYDROCHLORIDE 5 MG/ML
5 INJECTION INTRAMUSCULAR; INTRAVENOUS EVERY 8 HOURS PRN
Status: DISCONTINUED | OUTPATIENT
Start: 2020-01-01 | End: 2020-01-01

## 2020-01-01 RX ORDER — DIPHENHYDRAMINE HYDROCHLORIDE 50 MG/ML
12.5 INJECTION INTRAMUSCULAR; INTRAVENOUS ONCE
Status: COMPLETED | OUTPATIENT
Start: 2020-01-01 | End: 2020-01-01

## 2020-01-01 RX ORDER — AMIODARONE HYDROCHLORIDE 200 MG/1
200 TABLET ORAL DAILY
Status: DISCONTINUED | OUTPATIENT
Start: 2020-01-01 | End: 2020-01-01

## 2020-01-01 RX ORDER — WARFARIN SODIUM 1 MG/1
0.5 TABLET ORAL DAILY
COMMUNITY

## 2020-01-01 RX ORDER — ONDANSETRON 2 MG/ML
4 INJECTION INTRAMUSCULAR; INTRAVENOUS ONCE
Status: COMPLETED | OUTPATIENT
Start: 2020-01-01 | End: 2020-01-01

## 2020-01-01 RX ORDER — ONDANSETRON 4 MG/1
4 TABLET, ORALLY DISINTEGRATING ORAL EVERY 8 HOURS PRN
Qty: 30 TABLET | Refills: 0 | Status: SHIPPED | OUTPATIENT
Start: 2020-01-01 | End: 2020-01-01

## 2020-01-01 RX ORDER — CALCIUM CARBONATE/VITAMIN D3 250-3.125
2 TABLET ORAL DAILY
Status: DISCONTINUED | OUTPATIENT
Start: 2020-01-01 | End: 2020-01-01

## 2020-01-01 RX ORDER — DILTIAZEM HYDROCHLORIDE 180 MG/1
180 CAPSULE, EXTENDED RELEASE ORAL DAILY
Status: DISCONTINUED | OUTPATIENT
Start: 2020-01-01 | End: 2020-01-01

## 2020-01-01 RX ORDER — ACETAMINOPHEN 10 MG/ML
1000 INJECTION, SOLUTION INTRAVENOUS ONCE
Status: COMPLETED | OUTPATIENT
Start: 2020-01-01 | End: 2020-01-01

## 2020-01-01 ASSESSMENT — PATIENT HEALTH QUESTIONNAIRE - PHQ9
1. LITTLE INTEREST OR PLEASURE IN DOING THINGS: NOT AT ALL
CLINICAL INTERPRETATION OF PHQ2 SCORE: NO FURTHER SCREENING NEEDED
SUM OF ALL RESPONSES TO PHQ9 QUESTIONS 1 AND 2: 0
CLINICAL INTERPRETATION OF PHQ2 SCORE: NO FURTHER SCREENING NEEDED
CLINICAL INTERPRETATION OF PHQ9 SCORE: NO FURTHER SCREENING NEEDED
SUM OF ALL RESPONSES TO PHQ9 QUESTIONS 1 AND 2: 0
SUM OF ALL RESPONSES TO PHQ9 QUESTIONS 1 AND 2: 0
2. FEELING DOWN, DEPRESSED OR HOPELESS: NOT AT ALL
1. LITTLE INTEREST OR PLEASURE IN DOING THINGS: NOT AT ALL
2. FEELING DOWN, DEPRESSED OR HOPELESS: NOT AT ALL

## 2020-01-06 LAB — INR: 3 (ref 2–3)

## 2020-01-08 ENCOUNTER — ANTI-COAG VISIT (OUTPATIENT)
Dept: INTERNAL MEDICINE CLINIC | Facility: CLINIC | Age: 85
End: 2020-01-08

## 2020-01-08 DIAGNOSIS — I48.91 ATRIAL FIBRILLATION, UNSPECIFIED TYPE (HCC): ICD-10-CM

## 2020-01-08 DIAGNOSIS — Z79.01 LONG TERM (CURRENT) USE OF ANTICOAGULANTS: ICD-10-CM

## 2020-01-08 DIAGNOSIS — I48.20 CHRONIC ATRIAL FIBRILLATION (HCC): ICD-10-CM

## 2020-01-08 PROCEDURE — 93793 ANTICOAG MGMT PT WARFARIN: CPT | Performed by: INTERNAL MEDICINE

## 2020-01-09 ENCOUNTER — TELEPHONE (OUTPATIENT)
Dept: CARDIOLOGY CLINIC | Facility: CLINIC | Age: 85
End: 2020-01-09

## 2020-01-09 NOTE — TELEPHONE ENCOUNTER
Patient son/Cornelius calling to request script refill for rx:Furosemide (Lasix) please process as soon as possible, indicates he went to [address] and they sent E-script, please call [phone],FFKLLN.     Current Outpatient Medications:   •  furosem

## 2020-01-13 RX ORDER — FUROSEMIDE 20 MG/1
TABLET ORAL
Qty: 120 TABLET | Refills: 1 | Status: ON HOLD | OUTPATIENT
Start: 2020-01-13 | End: 2020-01-01

## 2020-01-13 NOTE — TELEPHONE ENCOUNTER
Verified medication dose, creatinine elevated but decreased from prior result. Meets protocol , refill order sent.   Hypertensive Medications  Protocol Criteria:  · Appointment scheduled with Cardiology in the past 12 months or in the next 3 months  · BMP o

## 2020-01-20 LAB — INR: 2.9 (ref 0.8–1.2)

## 2020-01-21 ENCOUNTER — ANTI-COAG VISIT (OUTPATIENT)
Dept: INTERNAL MEDICINE CLINIC | Facility: CLINIC | Age: 85
End: 2020-01-21

## 2020-01-21 DIAGNOSIS — Z79.01 LONG TERM (CURRENT) USE OF ANTICOAGULANTS: ICD-10-CM

## 2020-01-21 DIAGNOSIS — I48.91 ATRIAL FIBRILLATION, UNSPECIFIED TYPE (HCC): ICD-10-CM

## 2020-01-21 DIAGNOSIS — I48.20 CHRONIC ATRIAL FIBRILLATION (HCC): ICD-10-CM

## 2020-01-21 PROCEDURE — 93793 ANTICOAG MGMT PT WARFARIN: CPT | Performed by: INTERNAL MEDICINE

## 2020-02-03 ENCOUNTER — ANTI-COAG VISIT (OUTPATIENT)
Dept: INTERNAL MEDICINE CLINIC | Facility: CLINIC | Age: 85
End: 2020-02-03

## 2020-02-03 DIAGNOSIS — I48.91 ATRIAL FIBRILLATION, UNSPECIFIED TYPE (HCC): ICD-10-CM

## 2020-02-03 DIAGNOSIS — Z79.01 LONG TERM (CURRENT) USE OF ANTICOAGULANTS: ICD-10-CM

## 2020-02-03 DIAGNOSIS — I48.20 CHRONIC ATRIAL FIBRILLATION (HCC): ICD-10-CM

## 2020-02-03 LAB — INR: 2.2 (ref 0.8–1.2)

## 2020-02-17 LAB — INR: 2.6 (ref 0.8–1.2)

## 2020-02-18 ENCOUNTER — ANTI-COAG VISIT (OUTPATIENT)
Dept: INTERNAL MEDICINE CLINIC | Facility: CLINIC | Age: 85
End: 2020-02-18

## 2020-02-18 DIAGNOSIS — Z79.01 LONG TERM (CURRENT) USE OF ANTICOAGULANTS: ICD-10-CM

## 2020-02-18 DIAGNOSIS — I48.91 ATRIAL FIBRILLATION, UNSPECIFIED TYPE (HCC): ICD-10-CM

## 2020-02-18 DIAGNOSIS — I48.20 CHRONIC ATRIAL FIBRILLATION (HCC): ICD-10-CM

## 2020-02-18 PROCEDURE — 93793 ANTICOAG MGMT PT WARFARIN: CPT | Performed by: INTERNAL MEDICINE

## 2020-02-27 ENCOUNTER — OFFICE VISIT (OUTPATIENT)
Dept: OPHTHALMOLOGY | Facility: CLINIC | Age: 85
End: 2020-02-27
Payer: MEDICARE

## 2020-02-27 DIAGNOSIS — H26.491 AFTER-CATARACT OF RIGHT EYE WITH VISION OBSCURED: ICD-10-CM

## 2020-02-27 DIAGNOSIS — H04.123 DRY EYE SYNDROME, BILATERAL: ICD-10-CM

## 2020-02-27 DIAGNOSIS — H04.203 TEARING, BILATERAL: ICD-10-CM

## 2020-02-27 DIAGNOSIS — Z96.1 PSEUDOPHAKIA OF BOTH EYES: ICD-10-CM

## 2020-02-27 DIAGNOSIS — H35.3131 EARLY DRY STAGE NONEXUDATIVE AGE-RELATED MACULAR DEGENERATION OF BOTH EYES: Primary | ICD-10-CM

## 2020-02-27 DIAGNOSIS — H43.391 FLOATER, VITREOUS, RIGHT: ICD-10-CM

## 2020-02-27 PROBLEM — T85.612A ERODED CORNEAL SUTURE: Status: RESOLVED | Noted: 2019-02-26 | Resolved: 2020-02-27

## 2020-02-27 PROCEDURE — 92014 COMPRE OPH EXAM EST PT 1/>: CPT | Performed by: OPHTHALMOLOGY

## 2020-02-27 NOTE — PROGRESS NOTES
Anastacia Ospina is a 80year old female. HPI:     HPI     Patient is here for a complete exam.  She complains of occasional tearing in the left eye. She is using artificial tears OU PRN. She states her vision is good.   She is happy with her OTC readin TAKE AS DIRECTED By ANTICOAGULATION Clinic.: 1 tab daily PO in Evening. 90 tablet 0   • MetroNIDAZOLE 0.75 % External Gel   0   • BACTROBAN NASAL 2 % Nasal Ointment      • Calcium Carbonate-Vitamin D (OS-JEANNIE 500 + D) 500-200 MG-UNIT Oral Tab Take by mouth. happy with her OTC reading glasses                  ASSESSMENT/PLAN:     Diagnoses and Plan:     Age-related macular degeneration, dry  Discussed early macular degeneration changes in both eyes with the patient.   Stressed importance of taking either daily

## 2020-02-27 NOTE — ASSESSMENT & PLAN NOTE
Discussed posterior capsule opacity with patient. Could have YAG laser in the right eye in the near future. Discussed risks, benefits, treatments and alternatives. Information given and reviewed with patient.      Patient declines a YAG laser at this lesley

## 2020-03-03 LAB — INR: 2.6 (ref 2–3)

## 2020-03-04 ENCOUNTER — ANTI-COAG VISIT (OUTPATIENT)
Dept: INTERNAL MEDICINE CLINIC | Facility: CLINIC | Age: 85
End: 2020-03-04

## 2020-03-04 DIAGNOSIS — I48.0 PAROXYSMAL ATRIAL FIBRILLATION (HCC): ICD-10-CM

## 2020-03-04 DIAGNOSIS — I48.20 CHRONIC ATRIAL FIBRILLATION (HCC): ICD-10-CM

## 2020-03-04 DIAGNOSIS — I48.91 ATRIAL FIBRILLATION, UNSPECIFIED TYPE (HCC): ICD-10-CM

## 2020-03-04 DIAGNOSIS — Z79.01 LONG TERM (CURRENT) USE OF ANTICOAGULANTS: ICD-10-CM

## 2020-03-16 LAB — INR: 2.4 (ref 0.8–1.2)

## 2020-03-17 ENCOUNTER — TELEPHONE (OUTPATIENT)
Dept: CARDIOLOGY | Age: 85
End: 2020-03-17

## 2020-03-17 ENCOUNTER — ANTI-COAG VISIT (OUTPATIENT)
Dept: INTERNAL MEDICINE CLINIC | Facility: CLINIC | Age: 85
End: 2020-03-17

## 2020-03-17 DIAGNOSIS — I48.91 ATRIAL FIBRILLATION, UNSPECIFIED TYPE (HCC): ICD-10-CM

## 2020-03-17 DIAGNOSIS — I48.20 CHRONIC ATRIAL FIBRILLATION (HCC): ICD-10-CM

## 2020-03-17 DIAGNOSIS — Z79.01 LONG TERM (CURRENT) USE OF ANTICOAGULANTS: ICD-10-CM

## 2020-03-17 PROCEDURE — 93793 ANTICOAG MGMT PT WARFARIN: CPT | Performed by: NURSE PRACTITIONER

## 2020-03-19 ENCOUNTER — ANCILLARY PROCEDURE (OUTPATIENT)
Dept: CARDIOLOGY | Age: 85
End: 2020-03-19
Attending: INTERNAL MEDICINE

## 2020-03-19 ENCOUNTER — TELEPHONE (OUTPATIENT)
Dept: CARDIOLOGY | Age: 85
End: 2020-03-19

## 2020-03-19 DIAGNOSIS — Z95.810 ICD (IMPLANTABLE CARDIOVERTER-DEFIBRILLATOR) IN PLACE: ICD-10-CM

## 2020-03-19 PROCEDURE — 93295 DEV INTERROG REMOTE 1/2/MLT: CPT | Performed by: INTERNAL MEDICINE

## 2020-03-19 PROCEDURE — 93296 REM INTERROG EVL PM/IDS: CPT | Performed by: INTERNAL MEDICINE

## 2020-03-20 ENCOUNTER — APPOINTMENT (OUTPATIENT)
Dept: CARDIOLOGY | Age: 85
End: 2020-03-20

## 2020-03-30 ENCOUNTER — ANTI-COAG VISIT (OUTPATIENT)
Dept: INTERNAL MEDICINE CLINIC | Facility: CLINIC | Age: 85
End: 2020-03-30

## 2020-03-30 ENCOUNTER — TELEPHONE (OUTPATIENT)
Dept: INTERNAL MEDICINE CLINIC | Facility: CLINIC | Age: 85
End: 2020-03-30

## 2020-03-30 DIAGNOSIS — I48.20 CHRONIC ATRIAL FIBRILLATION (HCC): ICD-10-CM

## 2020-03-30 DIAGNOSIS — Z79.01 LONG TERM (CURRENT) USE OF ANTICOAGULANTS: ICD-10-CM

## 2020-03-30 DIAGNOSIS — I48.91 ATRIAL FIBRILLATION, UNSPECIFIED TYPE (HCC): ICD-10-CM

## 2020-03-30 LAB — INR: 3.2 (ref 0.8–1.2)

## 2020-03-30 PROCEDURE — 93793 ANTICOAG MGMT PT WARFARIN: CPT | Performed by: INTERNAL MEDICINE

## 2020-03-30 NOTE — TELEPHONE ENCOUNTER
Today's INR 3.2  Goal 2.0-3.0    Per protocol, decrease weekly dose by 10% and repeat INR in 1-2 weeks.      As patient has been therapeutic at current weekly dose since January, instructed to hold dose tomorrow- as patient already took dose tonight, then roe

## 2020-04-08 ENCOUNTER — APPOINTMENT (OUTPATIENT)
Dept: CT IMAGING | Facility: HOSPITAL | Age: 85
End: 2020-04-08
Attending: EMERGENCY MEDICINE
Payer: MEDICARE

## 2020-04-08 ENCOUNTER — HOSPITAL ENCOUNTER (EMERGENCY)
Facility: HOSPITAL | Age: 85
Discharge: HOME | End: 2020-04-08
Attending: EMERGENCY MEDICINE
Payer: MEDICARE

## 2020-04-08 VITALS
TEMPERATURE: 98 F | OXYGEN SATURATION: 96 % | WEIGHT: 169.75 LBS | SYSTOLIC BLOOD PRESSURE: 153 MMHG | HEART RATE: 58 BPM | HEIGHT: 62 IN | RESPIRATION RATE: 18 BRPM | BODY MASS INDEX: 31.24 KG/M2 | DIASTOLIC BLOOD PRESSURE: 75 MMHG

## 2020-04-08 DIAGNOSIS — S22.050A COMPRESSION FRACTURE OF T5 VERTEBRA, INITIAL ENCOUNTER (HCC): ICD-10-CM

## 2020-04-08 DIAGNOSIS — N28.9 RENAL INSUFFICIENCY: ICD-10-CM

## 2020-04-08 DIAGNOSIS — S20.211A CONTUSION OF RIB ON RIGHT SIDE, INITIAL ENCOUNTER: Primary | ICD-10-CM

## 2020-04-08 DIAGNOSIS — G93.89 SUPRASELLAR MASS: ICD-10-CM

## 2020-04-08 PROCEDURE — 36415 COLL VENOUS BLD VENIPUNCTURE: CPT

## 2020-04-08 PROCEDURE — 99284 EMERGENCY DEPT VISIT MOD MDM: CPT

## 2020-04-08 PROCEDURE — 80048 BASIC METABOLIC PNL TOTAL CA: CPT | Performed by: EMERGENCY MEDICINE

## 2020-04-08 PROCEDURE — 71250 CT THORAX DX C-: CPT | Performed by: EMERGENCY MEDICINE

## 2020-04-08 PROCEDURE — 72128 CT CHEST SPINE W/O DYE: CPT | Performed by: EMERGENCY MEDICINE

## 2020-04-08 PROCEDURE — 70450 CT HEAD/BRAIN W/O DYE: CPT | Performed by: EMERGENCY MEDICINE

## 2020-04-08 PROCEDURE — 85610 PROTHROMBIN TIME: CPT | Performed by: EMERGENCY MEDICINE

## 2020-04-08 NOTE — ED INITIAL ASSESSMENT (HPI)
Gissell Crowley this am, witnessed by caregiver. No loc. C/o pain to mid back. Currently taking blood thinner.

## 2020-04-08 NOTE — ED PROVIDER NOTES
Patient Seen in: [facility] AND [facility] Emergency Department      History   Patient presents with:  Fall    Stated Complaint:     HPI    79 y/o female on coumadin w/ a-fib who fell while going up the stairs this morning. Caregiver broke the fall.   C/o pain t pulses. Pulmonary/Chest: Effort normal. No respiratory distress. Abdominal: Soft. There is no tenderness. There is no guarding. Musculoskeletal: Normal range of motion. Small abrasion to the midline mid-T spine.   Mild pain of the R post rib area but Index Registry. FINDINGS:  LINES AND TUBES: There is a left-sided pacer with single lead within the right ventricle which is unchanged. .  MEDIASTINUM/VASCULATURE: There are multiple mildly prominent mediastinal lymph nodes which are nonspecific and measur airway inflammation. Cardiomegaly, unchanged.      Dictated by (CST): Gleen Oppenheim, MD on 4/08/2020 at 10:49 AM     Finalized by (CST): Gleen Oppenheim, MD on 4/08/2020 at 11:00 AM          Ct Brain Or Head (43560)    Result Date: 4/8/2020  PROCEDURE: CT BRAIN CONCLUSION:  1. No acute intracranial process. 2. Ovoid 1.0 cm dense extra-axial mass in the suprasellar cistern that abuts the superior margin of the pituitary gland.   It is uncertain if this finding represents superior extension of a pituitary mass a retropulsion into the central vertebral canal resulting in mild central vertebral canal stenosis. Vertebral body heights are otherwise maintained. DISC LEVELS: Loss of disc space height and spurring is seen at multiple thoracic disc spaces.   Mild facet saba Clinical Impression:  Contusion of rib on right side, initial encounter  (primary encounter diagnosis)  Compression fracture of T5 vertebra, initial encounter ([facility] [address]  Suprasellar mass  Renal insufficiency    Disposition:  Discharge  4/8/2020 11:58 am

## 2020-04-20 ENCOUNTER — ANTI-COAG VISIT (OUTPATIENT)
Dept: INTERNAL MEDICINE CLINIC | Facility: CLINIC | Age: 85
End: 2020-04-20

## 2020-04-20 ENCOUNTER — TELEPHONE (OUTPATIENT)
Dept: INTERNAL MEDICINE CLINIC | Facility: CLINIC | Age: 85
End: 2020-04-20

## 2020-04-20 DIAGNOSIS — I48.91 ATRIAL FIBRILLATION, UNSPECIFIED TYPE (HCC): ICD-10-CM

## 2020-04-20 DIAGNOSIS — Z79.01 LONG TERM (CURRENT) USE OF ANTICOAGULANTS: ICD-10-CM

## 2020-04-20 DIAGNOSIS — I48.20 CHRONIC ATRIAL FIBRILLATION (HCC): ICD-10-CM

## 2020-04-20 PROCEDURE — 93793 ANTICOAG MGMT PT WARFARIN: CPT | Performed by: INTERNAL MEDICINE

## 2020-04-20 NOTE — TELEPHONE ENCOUNTER
Today's INR 6.4  Goal 2.0-3.0    Instructed to hold 2 days and repeat INR on Wednesday 4/22. Weekly dose decreased by 50%- actual decrease 21.4%. Educated to increase greens in diet, especially while patient is taking Tylenol.        4/8 patient fell at [facility]

## 2020-04-22 ENCOUNTER — TELEPHONE (OUTPATIENT)
Dept: INTERNAL MEDICINE CLINIC | Facility: CLINIC | Age: 85
End: 2020-04-22

## 2020-04-22 ENCOUNTER — ANTI-COAG VISIT (OUTPATIENT)
Dept: INTERNAL MEDICINE CLINIC | Facility: CLINIC | Age: 85
End: 2020-04-22

## 2020-04-22 DIAGNOSIS — Z79.01 LONG TERM (CURRENT) USE OF ANTICOAGULANTS: ICD-10-CM

## 2020-04-22 DIAGNOSIS — I48.20 CHRONIC ATRIAL FIBRILLATION (HCC): ICD-10-CM

## 2020-04-22 DIAGNOSIS — I48.20 CHRONIC ATRIAL FIBRILLATION (HCC): Primary | ICD-10-CM

## 2020-04-22 DIAGNOSIS — I48.91 ATRIAL FIBRILLATION, UNSPECIFIED TYPE (HCC): ICD-10-CM

## 2020-04-22 PROCEDURE — 93793 ANTICOAG MGMT PT WARFARIN: CPT | Performed by: INTERNAL MEDICINE

## 2020-04-22 NOTE — TELEPHONE ENCOUNTER
Received call from patient's son Seun Jackson- he reports that Baldo Zabala has hemorrhoids and he noted a few drops of blood when she went to the bathroom. There is no active bleeding that cannot be stopped.  Reinforced to continue to monitor for signs/symptoms of bleedin

## 2020-04-22 NOTE — TELEPHONE ENCOUNTER
Today's INR 6.9  4/20 INR= 6.4    Per protocol, instructed to hold 2 days and repeat INR on Friday 4/24. At last visit, weekly dose decreased by 50%- actual decrease 21.4%- no additional changes made today.     4/8 patient fell at home- has a rib contusion

## 2020-04-22 NOTE — TELEPHONE ENCOUNTER
Received call from [facility]- to report a critical INR value of 6.9 for this patient.

## 2020-04-24 ENCOUNTER — ANTI-COAG VISIT (OUTPATIENT)
Dept: INTERNAL MEDICINE CLINIC | Facility: CLINIC | Age: 85
End: 2020-04-24

## 2020-04-24 DIAGNOSIS — I48.91 ATRIAL FIBRILLATION, UNSPECIFIED TYPE (HCC): ICD-10-CM

## 2020-04-24 DIAGNOSIS — I48.20 CHRONIC ATRIAL FIBRILLATION (HCC): ICD-10-CM

## 2020-04-24 DIAGNOSIS — Z79.01 LONG TERM (CURRENT) USE OF ANTICOAGULANTS: ICD-10-CM

## 2020-05-01 ENCOUNTER — ANTI-COAG VISIT (OUTPATIENT)
Dept: INTERNAL MEDICINE CLINIC | Facility: CLINIC | Age: 85
End: 2020-05-01

## 2020-05-01 DIAGNOSIS — I48.20 CHRONIC ATRIAL FIBRILLATION (HCC): ICD-10-CM

## 2020-05-01 DIAGNOSIS — Z79.01 LONG TERM (CURRENT) USE OF ANTICOAGULANTS: ICD-10-CM

## 2020-05-01 DIAGNOSIS — I48.91 ATRIAL FIBRILLATION, UNSPECIFIED TYPE (HCC): ICD-10-CM

## 2020-05-07 ENCOUNTER — ANTI-COAG VISIT (OUTPATIENT)
Dept: INTERNAL MEDICINE CLINIC | Facility: CLINIC | Age: 85
End: 2020-05-07

## 2020-05-07 DIAGNOSIS — I48.91 ATRIAL FIBRILLATION, UNSPECIFIED TYPE (HCC): ICD-10-CM

## 2020-05-07 DIAGNOSIS — Z79.01 LONG TERM (CURRENT) USE OF ANTICOAGULANTS: ICD-10-CM

## 2020-05-07 DIAGNOSIS — I48.20 CHRONIC ATRIAL FIBRILLATION (HCC): ICD-10-CM

## 2020-05-07 PROCEDURE — 93793 ANTICOAG MGMT PT WARFARIN: CPT | Performed by: INTERNAL MEDICINE

## 2020-06-16 PROBLEM — R42 DIZZINESS: Status: ACTIVE | Noted: 2020-01-01

## 2020-06-16 NOTE — PROGRESS NOTES
Due to COVID-19 ACTION PLAN, the patient's office visit was converted to a phone visit. ? This patient verbally consents to a telephone visit. Patient states they are Vito Lombardo and that they are speaking to me from their home.      It has been 8 mo today's call: 12 minutes  ? Testing should be completed prior to next visit. New prescriptions / refills sent to the pharmacy. Patient was advised to call if they experience any new or worsening symptoms. ? Return in about 6 months (around 12/16/2020).

## 2020-09-21 NOTE — TELEPHONE ENCOUNTER
Today's INR 3.1 goal 2.0-3.0    Reports she had less greens this past week- but otherwise keeps very consistent with salads and spinach weekly. Per protocol, patient to hold one dose of warfarin then continue current dosing and repeat INR in 2-4 weeks.

## 2020-11-06 PROBLEM — S09.8XXA BLUNT HEAD INJURY, INITIAL ENCOUNTER: Status: ACTIVE | Noted: 2020-01-01

## 2020-11-07 PROBLEM — R11.2 INTRACTABLE NAUSEA AND VOMITING: Status: ACTIVE | Noted: 2020-01-01

## 2020-11-07 PROBLEM — R51.9 ACUTE INTRACTABLE HEADACHE, UNSPECIFIED HEADACHE TYPE: Status: ACTIVE | Noted: 2020-01-01

## 2020-11-07 NOTE — PROGRESS NOTES
Calcium Carbonate-Vitamin D (OSCAL-500) 500-200 MG-UNIT per tab 2 tablet  is Non-Formulary Medication &  Auto-Substituted to Calcium Carbonate/Vitamin D 250-125 unit tab  2 tablets Per P&T PROTOCOL

## 2020-11-07 NOTE — PROGRESS NOTES
[facility]  Progress Note     Ival Ee  : 1930    Status: Observation  Day #: 0    Attending: Steven Bay MD  PCP: Alexander Monzon MD, MD      Assessment and Plan     Type III odontoid fracture of C2 vertebrae with associated 14.0   NEPRELIM 6.80 8.14*     Recent Labs   Lab 11/06/20  1808 11/07/20  1103   BUN 22* 18   CREATSERUM 1.23* 1.01   GFRAA 45* 57*   GFRNAA 39* 49*   CA 8.0* 8.2*   ALB 3.4  --     136   K 3.9 4.0    101   CO2 27.0 29.0   * 120*   BILT assessment. 2. There is anterior displacement of the lateral mass of C1 in relation to the right-sided C2 facet, resulting in rotational in atlantoaxial abnormality. Slight displacement of the right C2-C3 facets is noted.   3. Slight buckling of the anteri blunt injury of  the vessel, but there is preserved distal opacification of the right V4 segment, and no pseudoaneurysm formation is appreciated. 2. No additional acute injury of the anterior or posterior circulation in the head or neck.   3. Atherosclerot morphINE sulfate **OR** morphINE sulfate, zolpidem, bisacodyl, Prochlorperazine Edisylate, Metoclopramide HCl      Spent >35 minutes, >50% of the time counseling coordinating care. Discussed plan of care.      Edwar Avilez MD

## 2020-11-07 NOTE — ED NOTES
Orders for admission, patient is aware of plan and ready to go upstairs. Any questions, please call ED RUTH Daniels  at extension [phone].    Type of COVID test sent: Rapid  COVID Suspicion level: Low    Titratable drug(s) infusing: none  Rate:    LOC at time of

## 2020-11-07 NOTE — CONSULTS
[facility]    Neurosurgery Consultation    [place] Patient Status:  Observation    1930 MRN [id number]   Location [facility] 5SW/SE Attending Linwood Poole MD   Hosp Day # 0 PCP Braulio Degroot MD, MD     Date of Admi VOMITING  Ace Inhibitors          OTHER (SEE COMMENTS)    Comment:CLASS, renal insufficiency    Medications:    Current Facility-Administered Medications:   •  ALPRAZolam (XANAX) tab 0.25 mg, 0.25 mg, Oral, TID PRN  •  amiodarone HCl (PACERONE) tab 200 mg, Extremities:  Non-tender, no lower extremity edema noted. Labs:  Lab Results   Component Value Date    WBC 8.7 11/06/2020    HGB 11.9 (L) 11/06/2020    .0 11/06/2020    BUN 22 (H) 11/06/2020     11/06/2020    K 3.9 11/06/2020    CO2 27. assessment. 2. There is anterior displacement of the lateral mass of C1 in relation to the right-sided C2 facet, resulting in rotational in atlantoaxial abnormality. Slight displacement of the right C2-C3 facets is noted.   3. Slight buckling of the anteri blunt injury of  the vessel, but there is preserved distal opacification of the right V4 segment, and no pseudoaneurysm formation is appreciated. 2. No additional acute injury of the anterior or posterior circulation in the head or neck.   3. Atherosclerot provide a proper size-fit collar for this patient: short or pediatric. The regular is too large for her. She may be mobilized in the collar.     2. Regarding the VA displacement, I have discussed with the ER physician last night to consult neurointervention

## 2020-11-07 NOTE — PLAN OF CARE
Problem: PAIN - ADULT  Goal: Verbalizes/displays adequate comfort level or patient's stated pain goal  Description: INTERVENTIONS:  - Encourage pt to monitor pain and request assistance  - Assess pain using appropriate pain scale  - Administer analgesics patient's ability to be responsible for managing their own health  - Refer to Case Management Department for coordinating discharge planning if the patient needs post-hospital services based on physician/LIP order or complex needs related to functional sta pressure  - Maintain blood pressure and fluid volume within ordered parameters to optimize cerebral perfusion and minimize risk of hemorrhage  - Monitor temperature, glucose, and sodium.  Initiate appropriate interventions as ordered  Outcome: Progressing

## 2020-11-07 NOTE — ED NOTES
Pt transported to unit with all belonging, in aspen collar, alert and oriented x2, in no acute distress with transporter.

## 2020-11-07 NOTE — ED NOTES
Pt taken to CT with this RN and tolerated well. Upon return patient given ice pack for head. Pt denies nausea at this time.

## 2020-11-07 NOTE — H&P
[facility] Patient Status:  Emergency    1930 MRN [id number]   Location [address] Attending Jaja Cadena MD   Hosp Day # 0 PCP Dom Paulino MD, MD     Date: Medications:  Prior to Admission Medications   Prescriptions Last Dose Informant Patient Reported? Taking? Calcium Carbonate-Vitamin D (OS-JEANNIE 500 + D) 500-200 MG-UNIT Oral Tab   Yes No   Sig: Take 2 tablets by mouth daily.      DILT- MG Oral Capsul problem:  None. Eye:  Pupils are equal, round and reactive to light, extraocular movements are intact, Normal conjunctiva. HENT:  Normocephalic, oral mucosa is moist.  Head:  Normocephalic, atraumatic.   Neck: Immobilized, no carotid bruit, no jugular deisi communicating artery. Further workup is suggested. 3. Senescent changes of parenchymal volume loss with sequela of chronic microvascular ischemic disease.   4. There is large vessel atherosclerosis involving the anterior and posterior intracranial circulat a.m.    Hypothyroidism  Continue Synthroid. Hypertension with hypertensive heart disease  Blood pressure well controlled resume home medication.     Prophylaxis  Therapeutic on Coumadin    CODE STATUS  Full    Primary care physician  [facility] MD,

## 2020-11-07 NOTE — PHYSICAL THERAPY NOTE
PT evaluation orders received and chart reviewed. CT c-spine/brain imaging significant for:  Acute comminuted type III odontoid fracture extending to the lateral mass, pedicle, and lamina of C2 with resultant impingement of the right transverse foramen.

## 2020-11-07 NOTE — ED PROVIDER NOTES
Patient Seen in: [facility] AND [facility] Emergency Department      History   Patient presents with:  Fall    Stated Complaint: fall    HPI    80year old female with h/o CHF, hypothyroidism, A Fib on coumadin, htn, hld, mitral regurgitation who presents with f Current:/85 (BP Location: Left arm)   Pulse 76   Temp 97.6 °F (36.4 °C) (Rectal)   Resp 15   Ht 149.9 cm (4' 11.02\")   Wt 68.6 kg   SpO2 94%   Breastfeeding No   BMI 30.52 kg/m²         Physical Exam  Vitals signs and nursing note reviewed.    Nitza PROTHROMBIN TIME (PT) - Abnormal; Notable for the following components:    PT 24.2 (*)     INR 2.22 (*)     All other components within normal limits   LIPASE - Abnormal; Notable for the following components:    Lipase 41 (*)     All other components withi Mild buckling anteriorT3 vertebral body concerning for compression fracture. Multilevel lower cervical degenerative disc disease, moderate to severe from C4/5 through C6/7 with posterior disc occupy complexes leading to at least mild canal stenosis.     No Anterior circulation vessels are patent. Mild multifocal stenosis of bilateral MCA branches noted, likely intracranial atherosclerosis in a patient of this age. Posterior circulation vessels are patent.   Mild multifocal stenosis of the posterior cerebral CONCLUSION:  1. Negative for depressed calvarial fracture, coup/contrecoup intraparenchymal contusion, intracranial hemorrhage, or further evidence of acute intracranial process by noncontrast CT technique.   2. There is an indeterminate hyperattenuating ov CONCLUSION:  1. Moderate stool throughout the colon consistent with constipation/fecal retention. 2. Fluid-filled distended jejunum with normal caliber ileum. Findings may reflect regional small-bowel enteropathy versus small bowel ileus.   Partial small b Levothyroxine Sodium tab 137 mcg (has no administration in time range)   fenofibrate micronized (LOFIBRA) cap 67 mg (has no administration in time range)   acetaminophen (TYLENOL) tab 650 mg (has no administration in time range)   HYDROcodone-acetaminophen The patient was initially brought in with blunt head trauma on coumadin, CT neg for acute abnormality. She continued to complain of HA pointing across occiput and c/o nausea with nb/nb emesis.  She continued to say her stomach hurt although no obvious traum Take 1 tablet (4 mg total) by mouth every 8 (eight) hours as needed.   Qty: 30 tablet Refills: 0                            Present on Admission  Date Reviewed: 6/16/2020          ICD-10-CM Noted POA    * (Principal) Blunt head injury, initial encounter S09

## 2020-11-07 NOTE — CM/SW NOTE
MDO: SIM   DCSS FOR DON SCR REQ'D   SIM REF SENT IN AIDIN   F/U ON ACCEPTING SARs    CM/SW to remain available for support and/or discharge planning.     Aida Mendez RN, [facility]    Ext. [phone]

## 2020-11-07 NOTE — ED NOTES
Upon discharging patient and assisting her to wheelchair, pt reports c/o nausea & occipital head pain. Pt grabbing her head saying \"my head, my head\". MD notified and plan for patient to be admitted. Pt helped back to stretcher.  IV access will be establi

## 2020-11-07 NOTE — ED NOTES
Pt calling out in pain stating her head hurts. Pt repeated to that she has a fracture in her neck. When asked orientation questions, pt knows self and situation but does not know date and place. ER MD notified. Pt remains in aspen collar.  Will continue to

## 2020-11-08 NOTE — PLAN OF CARE
Problem: Patient Centered Care  Goal: Patient preferences are identified and integrated in the patient's plan of care  Description: Interventions:  - What would you like us to know as we care for you?  From home with son  - Provide timely, complete, and a Identify cognitive and physical deficits and behaviors that affect risk of falls.   - [place] fall precautions as indicated by assessment.  - Educate pt/family on patient safety including physical limitations  - Instruct pt to call for assistance with act mobility to safest level of function  Description: INTERVENTIONS:  - Assess patient stability and activity tolerance for standing, transferring and ambulating w/ or w/o assistive devices  - Assist with transfers and ambulation using safe patient handling e

## 2020-11-08 NOTE — PLAN OF CARE
Problem: Patient Centered Care  Goal: Patient preferences are identified and integrated in the patient's plan of care  Description: Interventions:  - What would you like us to know as we care for you?  From home with son  - Provide timely, complete, and a Identify cognitive and physical deficits and behaviors that affect risk of falls.   - [place] fall precautions as indicated by assessment.  - Educate pt/family on patient safety including physical limitations  - Instruct pt to call for assistance with act mobility to safest level of function  Description: INTERVENTIONS:  - Assess patient stability and activity tolerance for standing, transferring and ambulating w/ or w/o assistive devices  - Assist with transfers and ambulation using safe patient handling e

## 2020-11-08 NOTE — PROGRESS NOTES
[facility]  Progress Note     Evon Jimenez  : 1930    Status: Observation  Day #: 0    Attending: Kourtney Shankar MD  PCP: Jose Antonio Adhikari MD, MD      Assessment and Plan     Type III odontoid fracture of C2 vertebrae with associated Labs   Lab 11/06/20  1808 11/07/20  1103   BUN 22* 18   CREATSERUM 1.23* 1.01   GFRAA 45* 57*   GFRNAA 39* 49*   CA 8.0* 8.2*   ALB 3.4  --     136   K 3.9 4.0    101   CO2 27.0 29.0   * 120*   BILT 0.7  --    AST 71*  --    ALT 45  -- displacement of the lateral mass of C1 in relation to the right-sided C2 facet, resulting in rotational in atlantoaxial abnormality. Slight displacement of the right C2-C3 facets is noted.   3. Slight buckling of the anterior cortex of T3 raise the possibil there is preserved distal opacification of the right V4 segment, and no pseudoaneurysm formation is appreciated. 2. No additional acute injury of the anterior or posterior circulation in the head or neck.   3. Atherosclerotic vascular calcifications of the sulfate, zolpidem, bisacodyl, Prochlorperazine Edisylate, Metoclopramide HCl          Ana Asif MD

## 2020-11-08 NOTE — CM/SW NOTE
1159am:  SW spoke w/ pt's son, Seun Jackson, who is requesting that pt go to [facility]. Pt's son expressed concerns about COVID cases and precautions.  SW recommended that son call [facility] directly to ask about any COVID case, how it's being handled, pr

## 2020-11-08 NOTE — PLAN OF CARE
Problem: Patient Centered Care  Goal: Patient preferences are identified and integrated in the patient's plan of care  Description: Interventions:  - What would you like us to know as we care for you?   - Provide timely, complete, and accurate informatio Progressing     Problem: DISCHARGE PLANNING  Goal: Discharge to home or other facility with appropriate resources  Description: INTERVENTIONS:  - Identify barriers to discharge w/pt and caregiver  - Include patient/family/discharge partner in discharge deepak patient/family  Outcome: Progressing  Goal: Maintain proper alignment of affected body part  Description: INTERVENTIONS:  - Support and protect limb and body alignment per provider's orders  - Instruct and reinforce with patient and family use of appropria

## 2020-11-08 NOTE — OCCUPATIONAL THERAPY NOTE
OCCUPATIONAL THERAPY EVALUATION - INPATIENT     Room Number: 397/439-D  Evaluation Date: 11/8/2020  Type of Evaluation: Initial       Physician Order: IP Consult to Occupational Therapy  Reason for Therapy: ADL/IADL Dysfunction and Discharge Planning    OC tolerate she may require pediatric [place] collar. Education Provided: Educated patient in role of OT and POC    General Observations:  The patient's Approx Degree of Impairment: 66.57% has been calculated based on documentation in the [facility] '6 clicks' In day. Per RN she does not have 24/7 support at home. Family does assist as needed.      SUBJECTIVE  Patient is pleasantly confused     OCCUPATIONAL THERAPY EXAMINATION      OBJECTIVE  Precautions: Cervical brace;Bed/chair alarm([place] collar AT ALL TIMES; ) fair-    FUNCTIONAL ADL ASSESSMENT  Grooming: min a   Feeding: min a   Bathing: max a   Toileting: mod a   Upper Extremity Dressing: mod a   Lower Extremity Dressing: max a     Patient End of Session: Up in chair;Needs met;Call light within reach;RN aware

## 2020-11-08 NOTE — PHYSICAL THERAPY NOTE
PHYSICAL THERAPY EVALUATION - INPATIENT     Room Number: 571/609-Z  Evaluation Date: 11/8/2020  Type of Evaluation: Initial   Physician Order: PT Eval and Treat    Presenting Problem: fall from w/c into brick wall; C1-2 acute comminuted type III odontooid and spine in correct position. Patient needs minimal assistance to move to EOB. Tolerates sitting several minutes and then stands to RW with min A and transfer with RW to chair with min A and good hand placement.  She demonstrates equal SOLE in BLE and isola History  Past Surgical History:   Procedure Laterality Date   • CATARACT EXTRACTION W/  INTRAOCULAR LENS IMPLANT Right 4/11/16    RJM-with general anesthesia, Shugarcaine, and Vision Blue   • CATARACT EXTRACTION W/  INTRAOCULAR LENS IMPLANT Left unknown ye WALK  SPO2 on Room Air at Rest: 99  SPO2 Ambulation on Room Air: 99            AM-PAC '6-Clicks' [address] [place]  How much difficulty does the patient currently have. ..  -   Turning over in bed (including adjusting bedclothes, sheets demonstrate supine - sit EOB @ level: supervision     Goal #1   Current Status    Goal #2 Patient is able to demonstrate transfers EOB to/from [facility] at assistance level: supervision with walker - rolling     Goal #2  Current Status    Goal #3 Patient is able

## 2020-11-09 NOTE — PLAN OF CARE
Problem: Patient Centered Care  Goal: Patient preferences are identified and integrated in the patient's plan of care  Description: Interventions:  - What would you like us to know as we care for you?  From home with son  - Provide timely, complete, and a Identify cognitive and physical deficits and behaviors that affect risk of falls.   - [place] fall precautions as indicated by assessment.  - Educate pt/family on patient safety including physical limitations  - Instruct pt to call for assistance with act mobility to safest level of function  Description: INTERVENTIONS:  - Assess patient stability and activity tolerance for standing, transferring and ambulating w/ or w/o assistive devices  - Assist with transfers and ambulation using safe patient handling e

## 2020-11-09 NOTE — PLAN OF CARE
A/Ox4: confused. Fall riskprecautions appropiate for pt: bed in lowest position, call light within reach, non-skid socks. Bed/Chair alarm on.  Ok'd to be discharged today. Discharge instructions given to pt and son-Cornelius: voice understanding. IV removed. Utilize distraction and/or relaxation techniques  - Monitor for opioid side effects  - Notify MD/LIP if interventions unsuccessful or patient reports new pain  - Anticipate increased pain with activity and pre-medicate as appropriate  Outcome: Adequate for infection  Description: INTERVENTIONS:  - Assess and document risk factors for pressure ulcer development  - Assess and document skin integrity  - Assess and document dressing/incision, wound bed, drain sites and surrounding tissue  - Implement wound care Interventions:  - Allow additional time for processing after asking questions or providing instructions  Outcome: Adequate for Discharge

## 2020-11-09 NOTE — CM/SW NOTE
11/09/20 1300   Discharge disposition   Expected discharge disposition Skilled Nurs   Name of [address]   Patient is Discharged to a [address] [place] [address] Yes   Discharge transportation [facility]  (3:30 per [place])

## 2020-11-09 NOTE — CM/SW NOTE
SW informed that pt is ready for d/c.  SW informed as of this documentation that insurance [employer] is still pending. Clinical updates uploaded via "[employer].     CORDELL Walker, [facility]   BBB:#[id number]

## 2020-11-09 NOTE — PROGRESS NOTES
[facility]  Progress Note     Domitila Carlton  : 1930    Status: Observation  Day #: 0    Attending: Shea Argueta MD  PCP: Lisa Guzman MD, MD      Assessment and Plan     Type III odontoid fracture of C2 vertebrae with associated Recent Labs   Lab 11/06/20  1808 11/07/20  1103 11/08/20  0805 11/09/20  0737   BUN 22* 18 22*  --    CREATSERUM 1.23* 1.01 1.23*  --    GFRAA 45* 57* 45*  --    GFRNAA 39* 49* 39*  --    CA 8.0* 8.2* 8.3*  --    ALB 3.4  --   --   --     136 140

## 2020-11-09 NOTE — DISCHARGE SUMMARY
[facility]  Discharge Summary     Angie Jean Baptiste  : 1930    Status: Observation  Day #: 0    Attending: Edwar Avilez MD  PCP: Paige Tyler MD, MD     Date of Admission:  2020  Date of Discharge:  2020      [facility] (36.6 °C), temperature source Axillary, resp. rate 18, height 4' 11.02\" (1.499 m), weight 151 lb 3.2 oz (68.6 kg), SpO2 95 %, not currently breastfeeding.   General:  Alert, no distress  HEENT:  Neck brace in place  Cardiac:  Regular rate, regular rhythm changed: additional instructions      Take as directed. If you are unsure how to take this medication, talk to your nurse or doctor. Original instructions: 1 tab daily PO in Evening on Tues/Thurs.    Refills: 0        CONTINUE taking these medications Amrit Harvey      Take 17 g by mouth daily.    Refills: 0           Where to Get Your Medications      These medications were sent to [place] [id number] [place] [address], [facility] Aiden Guerra, [phone], [phone]

## 2020-11-10 NOTE — PROGRESS NOTES
Ofelia Leary  : 1930  Age 80year old  female patient is admitted to [place] [address] for rehab and strengthening     Chief complaint: Type III odontoid fracture of C2 vertebrae with associated compression of right vertebral artery 2/2 Fall Date   • CATARACT EXTRACTION W/  INTRAOCULAR LENS IMPLANT Right 4/11/16    RJM-with general anesthesia, Shugarcaine, and Vision Blue   • CATARACT EXTRACTION W/  INTRAOCULAR LENS IMPLANT Left unknown year    unknown physician     Family History   Problem Re Glycol 3350 Oral Powder Take 17 g by mouth daily. • Estradiol 0.1 MG/GM Vaginal Cream Place 1 gram vaginally twice a week     • Fenofibrate 48 MG Oral Tab Take 48 mg by mouth.      • carvedilol 12.5 MG Oral Tab Take 12.5 mg by mouth 2 (two) times manisha PERRLA, EOMI, sclera anicteric, conjunctiva normal; there is no nystagmus, no drainage from eyes  HENT: normocephalic; normal nose, no nasal drainage, mucous membranes pink, moist, pharynx no exudate, no visible cerumen.   NECK: neck collar on , denies pain

## 2020-11-16 NOTE — PROGRESS NOTES
Colin Roland  : 1930  Age 80year old  female patient is admitted to [place] [address] for rehab and strengthening      Chief complaint: Type III odontoid fracture of C2 vertebrae with associated compression of right vertebral artery 2/2 Fal Thyroid disease              Past Surgical History:   Procedure Laterality Date   • CATARACT EXTRACTION W/  INTRAOCULAR LENS IMPLANT Right 4/11/16     RJM-with general anesthesia, Shugarcaine, and Vision Blue   • CATARACT EXTRACTION W/  INTRAOCULAR LENS IM mouth daily.         • Estradiol 0.1 MG/GM Vaginal Cream Place 1 gram vaginally twice a week       • Fenofibrate 48 MG Oral Tab Take 48 mg by mouth.       • carvedilol 12.5 MG Oral Tab Take 12.5 mg by mouth 2 (two) times daily with meals.         • Levothy EOMI, sclera anicteric, conjunctiva normal; there is no nystagmus, no drainage from eyes  HENT: normocephalic; normal nose, no nasal drainage, mucous membranes pink, moist, pharynx no exudate, no visible cerumen.   NECK: neck collar on , denies pain   BREAS was spent counseling the patient and/or coordinating care.     Carmen Dodd, APRN  11/16/20   11:29 AM

## 2020-11-17 NOTE — PROGRESS NOTES
Dangelo Soto  : 1930  Age 80year old  female patient is admitted to [place] [address] for rehab and strengthening      Chief complaint: Type III odontoid fracture of C2 vertebrae with associated compression of right vertebral artery 2/2 Fal Comment:CLASS, renal insufficiency    CODE STATUS:  Full Code    ADVANCED CARE PLANNING TEAM: family care plan , discharge 11/20/20        CURRENT MEDICATIONS - reviewed and updated     Current Outpatient Medications   Medication Sig Dispense Refill   • AL Oral Tab Take 2 tablets by mouth daily. • amiodarone HCl (PACERONE) 200 MG Oral Tab Take 1 tablet by mouth daily.   6       VITALS:  /64   Pulse 66   Temp 98 °F (36.7 °C) (Tympanic)   Resp 20   Wt 151 lb (68.5 kg)   SpO2 98%   BMI 30.48 kg/m² commands  PSYCHIATRIC: alert and oriented x 3; affect appropriate, forgetful       SEE PLAN BELOW  1. Type III odontoid fracture of C2 vertebrae with associated compression of right vertebral artery 2/2 Fall  -NS on consult, was seen by Dr. Andrzej Felipe  -no surg

## 2020-11-19 NOTE — PROGRESS NOTES
Tamar Sotoan, 5/26/1930, 80year old, female is being discharged from [place] [address] 11/20/20 home with son and has caregiver and is in a day care     DISCHARGE SUMMARY    Date of Admission [facility] : 11/6/20 to 11/9/20    Date of Discharge [facility] uncomfortable to patient .  Does participate in therapy . Denies changes in bowels or bladder. Reports no pain .  No new issues or concerns .      REVIEW OF SYSTEMS:  GENERAL HEALTH:feels well otherwise  SKIN: denies any unusual skin lesions or rashes  WOUN fracture of C2 vertebrae with associated compression of right vertebral artery 2/2 Fall  -NS on consult, was seen by Dr. Sofía Robertson  -no surgery recommended  -con't C-collar, check collar fit q shift to prevent skin breakdown   -PT/OT  -f/u imaging in 3 months

## 2020-12-07 NOTE — TELEPHONE ENCOUNTER
Today's INR 5.1  Goal 2-3  She has been taking pain medication and has poor appetite.  Son plans on increasing some greens, instructed per protocol to hold 2 doses and decrease by 10-15% (no warfarin on Wednesdays) actual decrease 14.3% he will recheck INR

## 2020-12-07 NOTE — TELEPHONE ENCOUNTER
All ready dosed see Anticoag Encounter. Susana made aware we received the fax and patient family notified of dosing.

## 2020-12-07 NOTE — TELEPHONE ENCOUNTER
Erick Osorio at [facility] calling to report a critical lab results. I tried to connect call with RN Cara Ledbetter, but caller hung up.  Nurse is aware

## 2020-12-10 NOTE — TELEPHONE ENCOUNTER
Today's INR 3.7  Goal 2-3   Patient has 1 mg tab taking 1/2 tab 6 days a week and 0 tabs 1 day a week.   Protocol: decrease weekly dose by 5-10% to have her take 0 tabs 2 days a week drops INR by 16.7%  Son is now feeding her baby food and ensure (high in v

## 2020-12-13 PROBLEM — N30.00 ACUTE CYSTITIS WITHOUT HEMATURIA: Status: ACTIVE | Noted: 2020-01-01

## 2020-12-13 PROBLEM — R41.89 EPISODE OF UNRESPONSIVENESS: Status: ACTIVE | Noted: 2020-01-01

## 2020-12-13 PROBLEM — R40.4 EPISODE OF UNRESPONSIVENESS: Status: ACTIVE | Noted: 2020-01-01

## 2020-12-13 NOTE — ED NOTES
.Orders for admission, patient is aware of plan and ready to go upstairs. Any questions, please call ED RN David Davenport at extension [phone].    Type of COVID test sent:Rapid  COVID Suspicion level: Low  Titratable drug(s) infusing:None  Rate:  LOC at time of transp

## 2020-12-13 NOTE — ED PROVIDER NOTES
Patient Seen in: [facility] AND [facility] Emergency Department    History   Patient presents with:  Altered Mental Status    Stated Complaint: ams     HPI    68-year-old female with past medical history of hypertension, CHF, hypothyroid, atrial fibrillation on am mouth. 0.5mg M/W/F/[place]   influenza Vac Split High-Dose 0.5 ML Intramuscular Suspension Prefilled Syringe,  ADM 0.5ML IM UTD   cholecalciferol (D 1000) 25 MCG (1000 UT) Oral Cap,  Take 1,000 Units by mouth daily.    DILT- MG Oral Capsule SR 24 Hr,  T except as otherwise stated in HPI.     Physical Exam     ED Triage Vitals [12/13/20 1413]   /69   Pulse 68   Resp 16   Temp 97.8 °F (36.6 °C)   Temp src Oral   SpO2 98 %   O2 Device        Current:/69   Pulse 68   Temp 97.8 °F (36.6 °C) (Oral) Normal   RAPID SARS-COV-2 BY PCR - Normal   CBC WITH DIFFERENTIAL WITH PLATELET    Narrative: The following orders were created for panel order CBC WITH DIFFERENTIAL WITH PLATELET.   Procedure                               Abnormality         Status visible mass or adenopathy. LUNGS/PLEURA: No airspace consolidation, pleural effusion, or pneumothorax is evident. Stable elevation of the left hemidiaphragm. BONES: Mild degenerative changes of the thoracic spine are apparent. Demineralization.  OTHER: the right cerebellum, which is new since prior. BRAINSTEM: No edema, hemorrhage, mass, or acute infarction is seen. CALVARIUM: There is no apparent depressed fracture, mass, or other significant visible lesion.   SINUSES: Near complete opacification of the contrast material. Multi-planar reformatted/3-D images were created to optimize visualization of vascular anatomy. Automated exposure control for dose reduction was used. Evaluation of internal carotid stenosis is based on NASCET Criteria.    FINDINGS:  CA visible aneurysm or vascular malformation. MIDDLE CEREBRALS: No significant stenosis. No visible aneurysm or vascular malformation. POSTERIOR CEREBRALS: No significant stenosis. No visible aneurysm or vascular malformation.   OTHER FINDINGS:  Bilateral l for which patient not TPA candidate secondary to aforementioned in anticoagulated patient.   Recent head trauma and C-spine fracture noted with with concomitant right vertebral compression for which CTA obtained for possible LVO and or vertebral pathology w

## 2020-12-13 NOTE — H&P
[facility] Patient Status:  Emergency    1930 MRN [id number]   Location [facility] Attending Miky Arcos MD   Hosp Day # 0 PCP Dom Paulino MD, MD     Michele NAUSEA AND VOMITING  Ace Inhibitors          OTHER (SEE COMMENTS)    Comment:CLASS, renal insufficiency  (Not in a hospital admission)      Review of Systems:   Pertinent items are noted in HPI. 12 systems reviewed and otherwise negative.      Phys process by noncontrast CT technique. 2. 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   3. Nonspecific white matter changes involving both cerebral hemispheres that discussed with Dr. Aida Camacho.     Dictated by (CST): Alyssa Hickman MD on 12/13/2020 at 2:48 PM     Finalized by (CST): Alyssa Hickman MD on 12/13/2020 at 3:02 PM          Ekg 12-lead    Result Date: 12/13/2020  ECG Report  Interpretation  ------------------

## 2020-12-13 NOTE — ED INITIAL ASSESSMENT (HPI)
Pt to ED from home by EMS for c/o altered mental status per caregiver. Pt LNK 45 mins PTA at 1325. Caregiver reports to EMS that patient was sitting in chair and suddenly was unable to speak. EMS BG was 123, pt A&OX2-3 in EMS. Pt also vomited PTA.      Pt a

## 2020-12-14 NOTE — CM/SW NOTE
Received MDO for D/C Planning. Per RN rounds, pt is AOX2. SW contacted pt's son, Tona Angel, via phone. He verified pt's address. He confirmed that he does not live w/ pt but he stays w/ her overnight after work and throughout the weekend.     Pt has a CG, Erick Jones discharge*    IF SNF: Need insurance authorization prior to d/c. PLAN: Home w/ [facility] vs SNF, pending pt's clinical course    SW/CM to remain available for support and/or discharge planning.          Kimberly Vega, [facility]

## 2020-12-14 NOTE — PLAN OF CARE
Patient is alert with bouts of confusion. Currently on room air. Patient continuously asking for reposition of legs however when asked about pain, is always unsure. Crushing medications and giving with applesauce; tolerates well.  IV fluids infusing; Monitor swallowing and airway patency with patient fatigue and changes in neurological status  - Encourage and assist patient to increase activity and self care with guidance from PT/OT  - Encourage visually impaired, hearing impaired and aphasic patients

## 2020-12-14 NOTE — SLP NOTE
ADULT SWALLOWING EVALUATION    ASSESSMENT    ASSESSMENT/OVERALL IMPRESSION:    Orders received, chart reviewed, clinical bedside swallow evaluation complete.  Patient admitted s/p episode of unresponsiveness, +UTI; received alert and upright in bed, C-colla 12/13/20:  CONCLUSION:   1. Negative for radiographically evident acute intrathoracic process. 2. Stable elevation of the left hemidiaphragm. 3. Stable moderate cardiomegaly. 4. Left chest wall AICD. [address] [place] [address] 12/13/20:CONCLUSION:   1.  No acute intracrania

## 2020-12-14 NOTE — PLAN OF CARE
Problem: Patient Centered Care  Goal: Patient preferences are identified and integrated in the patient's plan of care  Description: Interventions:  - What would you like us to know as we care for you?  Patient speaks Arnaldo but understands [place]   - Grace use assistive/communication devices  Outcome: Progressing     Problem: GENITOURINARY - ADULT  Goal: Absence of urinary retention  Description: INTERVENTIONS:  - Assess patient’s ability to void and empty bladder  - Monitor intake/output and perform bladder

## 2020-12-14 NOTE — WOUND PROGRESS NOTE
WOUND CARE NOTE    History:  Past Medical History:   Diagnosis Date   • Congestive heart disease ([facility] [address]    • Congestive heart failure (HCC)    • Disorder of thyroid    • Thyroid disease      Past Surgical History:   Procedure Laterality Date   • CATARACT sacral foam dressing applied appropriately. Pt tolerated tx well, bedside RN updated to wound care and dressing plans.      Allergies: Tramadol and Ace Inhibitors    Labs:   Lab Results   Component Value Date    WBC 5.5 12/14/2020    HGB 12.5 12/14/2020

## 2020-12-14 NOTE — PROGRESS NOTES
[facility]    Progress Note    Particia Vineet Patient Status:  Inpatient    1930 MRN [id number]   Location [facility] 3W/SW Attending Nico Quinones MD   Hosp Day # 1 PCP Miguelina Hi MD, MD       SUBJECTIVE:    Feel Sea Coreas MD on 12/13/2020 at 4:49 PM          Ct Stroke Brain (no Iv)(cpt=70450)    Result Date: 12/13/2020  CONCLUSION:  1. No acute intracranial process by noncontrast CT technique.  2. Small subacute to chronic-appearing infarct at the periphery o abscess formation at teeth # 3 and 18. 9. Lesser incidental findings as above. This report was called immediately at 1500 hours to the emergency room and discussed with Dr. Daily Dennis.     Dictated by (CST): Antoinette Godinez MD on 12/13/2020 at 2:48 PM     Tatiana metabolic encephalopathy  - seems to have improved - per son who is at bedside she seems to be at her baseline  - could be related to UTI vs subacute CVA  - neurology on consult  - treat uti with ceftriaxone pending culture results - klebsiella growing;

## 2020-12-14 NOTE — CONSULTS
Consult dictated. Episode of unresponsiveness. Patient has dementia. Exam reveals some clumsiness in the right arm, right leg. Pituitary macroadenoma. Subacute right cerebellar infarct.   CTA report of November 7 reviewed, possibly mechanical, traumati

## 2020-12-14 NOTE — PAYOR COMM NOTE
--------------  Appropriate for inpatient status per guidelines for unresponsiveness due to subacute CVA with UTI.       ADMISSION REVIEW     Payor: Huyen Rose MEDICARE ADV PPO  Subscriber #:  [id number]  Authorization Number: [id number]       ED Provider Notes in HPI. Constitutional and vital signs reviewed. All other systems reviewed and negative except as noted above. PSFH elements reviewed from today and agreed except as otherwise stated in HPI.     Physical Exam     ED Triage Vitals [12/13/20 98 91 97 normal limits   CBC W/ DIFFERENTIAL - Abnormal; Notable for the following components:    RDW-SD 53.3 (*)     All other components within normal limits   TROPONIN I - Normal   RAPID SARS-COV-2 BY PCR - Normal   CBC WITH DIFFERENTIAL WITH PLATELET    Narrati mild tortuosity of the thoracic aorta with peripheral atherosclerotic vascular calcification. The pulmonary vascularity is within normal limits. MEDIAST/BRIAN: No visible mass or adenopathy.  LUNGS/PLEURA: No airspace consolidation, pleural effusion, or pneu hypodense foci noted in the subcortical and periventricular white matter of both cerebral hemispheres. CEREBELLUM: Small subacute to chronic infarct at the periphery of the right cerebellum, which is new since prior.  BRAINSTEM: No edema, hemorrhage, mass, 11/07/2020, 3:46 AM.  INDICATIONS: Altered mental status changes.   TECHNIQUE: CT images of the neck and brain were obtained following the infusion of non-ionic intravenous contrast material. Multi-planar reformatted/3-D images were created to optimize visu compression fracture. Bilateral maxillary sinus mucosal thickening with surrounding osseous sclerosis. INTRACRANIAL ARTERIES: ANTERIOR CEREBRALS:  No significant stenosis. No visible aneurysm or vascular malformation.   MIDDLE CEREBRALS: No significant s Monitor Interpretation: Pulse Readings from Last 1 Encounters:  12/13/20 : 68  , sinus,      Evaluation for episode of unresponsiveness with rapidly improving symptoms for which patient not TPA candidate secondary to aforementioned in anticoagulated patien temperature source Oral, resp. rate 15, weight 139 lb 1.8 oz (63.1 kg), SpO2 98 %, not currently breastfeeding.      Gen: No acute distress  Pulm: Lungs clear, normal respiratory effort  CV: Heart with regular rate and rhythm  Abd: Abdomen soft, nontender, DVT proph: coumadin    Dispo: pending neuro and pt recs    **Certification      PHYSICIAN Certification of Need for Inpatient Hospitalization - Initial Certification    Patient will require inpatient services that will reasonably be expected to span two pt recs    Lab Results   Component Value Date     WBC 5.5 12/14/2020     HGB 12.5 12/14/2020     HCT 38.7 12/14/2020     .0 12/14/2020     CREATSERUM 1.02 12/14/2020     BUN 15 12/14/2020      12/14/2020     K 4.4 12/14/2020      12/14/2 fenofibrate micronized (LOFIBRA) cap 67 mg     Date Action Dose Route User    12/14/2020 0844 Given 67 mg Oral Done, Desiree Sinclair, RN      furosemide (LASIX) tab 20 mg     Date Action Dose Route User    12/14/2020 0844 Given 20 mg Oral Done, REAGAN Pablo

## 2020-12-14 NOTE — CONSULTS
[facility]    Report of Consultation    Prince Showers Patient Status:  Inpatient    1930 MRN [id number]   Location [facility] 3W/SW Attending Jose Davis MD   Hosp Day # 2 PCP Rossana Guajardo MD, MD     Date of Admi Oral, BID with meals  •  dilTIAZem (cardIZEM CD) 24 hr cap 180 mg, 180 mg, Oral, Daily  •  fenofibrate micronized (LOFIBRA) cap 67 mg, 67 mg, Oral, Daily with breakfast  •  furosemide (LASIX) tab 20 mg, 20 mg, Oral, Daily  •  lactulose (CHRONULAC) 10 GM/15 stroke  This was also noted on CT scan in April 2020 and is stable in size  Endocrinology is consulted for further evaluation  Will check her pituitary hormones  Labs to be drawn between 7-8 am  Cortisol: slightly high but this could be stress related, jenifer

## 2020-12-14 NOTE — CONSULTS
[facility]    PATIENT'S NAME: [email or web address], [place]   ATTENDING PHYSICIAN: Nghia Copeland MD   CONSULTING PHYSICIAN: Katharine Posada MD   PATIENT ACCOUNT#:   [id number]    LOCATION:  [address] RECORD #:   [id number]       DATE OF BIRTH: in November 2020. Also, pituitary macroadenoma. CTA of today and of November 7 reviewed. IMPRESSION:  Episode of unresponsiveness. Subacute right cerebellar stroke. Pituitary macroadenoma. Will obtain a 2-D echo. Lipid panel.   She is already antic

## 2020-12-14 NOTE — PROGRESS NOTES
[facility]    Progress Note    Colin Roland Patient Status:  Inpatient    1930 MRN [id number]   Location [facility] 3W/SW Attending Yony Reed MD   Hosp Day # 1 PCP Carlos Alberto Perea MD, MD       Subjective:   Darius Goodman complaints or deficits  HEENT: denies nasal congestion, sinus pain or sore throat; hearing loss negative  RESPIRATORY: denies shortness of breath, wheezing or cough   CARDIOVASCULAR: denies chest pain or LUNA; no palpitations   GI: denies nausea, vomiting, mass with suprasellar extension. This may relate to a pituitary macroadenoma amongst other less likely possibilities. Consider pituitary protocol MR of the brain for complete evaluation. 6. Findings that suggest chronic bilateral maxillary sinusitis.  7.

## 2020-12-15 NOTE — PLAN OF CARE
CONTINUING IV ANTIBIOTICS, JATIN, 2D ECHO TODAY, WOUND CONSULT, P.T./O.T. EVALS ORDERED FOR TOMORROW    Problem: Patient Centered Care  Goal: Patient preferences are identified and integrated in the patient's plan of care  Description: Interventions:  - indicated  Outcome: Progressing     Problem: Patient/Family Goals  Goal: Patient/Family Long Term Goal  Description: Patient's Long Term Goal: To get stronger    Interventions:  - Follow plan of care  - Take medications as prescribed   - See additional Car

## 2020-12-15 NOTE — PHYSICAL THERAPY NOTE
PHYSICAL THERAPY EVALUATION - INPATIENT     Room Number: 316/316-A  Evaluation Date: 12/15/2020  Type of Evaluation: Initial   Physician Order: PT Eval and Treat    Presenting Problem: R cerebellar CVA, episode of unresponsiveness  Reason for Therapy:  Mob functional mobility. Recommended to RN to use two assist, short distances. Patient's current functional deficits include transfers, gait, balance, activity tolerance, cervical colloar, which are below the patient's pre-admission status.      The patient' Problems:    Acute cystitis without hematuria      Past Medical History  Past Medical History:   Diagnosis Date   • Congestive heart disease ([facility] [address]    • Congestive heart failure (HCC)    • Disorder of thyroid    • Thyroid disease        Past Surgical Histor have...  -   Turning over in bed (including adjusting bedclothes, sheets and blankets)?: A Lot   -   Sitting down on and standing up from a chair with arms (e.g., wheelchair, bedside commode, etc.): A Little   -   Moving from lying on back to sitting on th will negotiate TBD stairs/one curb w/ assistive device and supervision   Goal #4   Current Status    Goal #5 Patient to demonstrate independence with home activity/exercise instructions provided to patient in preparation for discharge.    Goal #5   Current

## 2020-12-15 NOTE — CM/SW NOTE
Per RN rounds, pt is unsteady currently and will have PT/OT evaluations when appropriate. SW sent tentative SNF referrals via Aidin for review. PT/OT evals pending when medically appropriate.     IF HH: *HHC orders needed prior to discharge*    IF SNF

## 2020-12-15 NOTE — PROGRESS NOTES
[facility]    Progress Note    [facility] Patient Status:  Inpatient    1930 MRN [id number]   Location [facility] 3W/SW Attending Ralph Flores MD   Hosp Day # 2 PCP Madeline Velez MD, MD       SUBJECTIVE:    Feel 12/13/2020 at 4:49 PM          Ct Stroke Brain (no Iv)(cpt=70450)    Result Date: 12/13/2020  CONCLUSION:  1. No acute intracranial process by noncontrast CT technique.  2. Small subacute to chronic-appearing infarct at the periphery of the right cerebellum teeth # 3 and 18. 9. Lesser incidental findings as above. This report was called immediately at 1500 hours to the emergency room and discussed with Dr. Luis Ernique Lei.     Dictated by (CST): Jorge L Calvert MD on 12/13/2020 at 2:48 PM     Finalized by (CST): Irineo encephalopathy  - seems to have improved - per son who is at bedside she seems to be at her baseline  - could be related to UTI vs subacute CVA  - neurology on consult  - treat uti with ceftriaxone pending culture results - klebsiella growing  - pt/ot eval

## 2020-12-15 NOTE — PAYOR COMM NOTE
--------------  CONTINUED STAY REVIEW    Payor: [employer] MEDICARE ADV PPO  Subscriber #:  [id number]  Authorization Number: [id number]          12/14 EEG    IMPRESSION:  Slight generalized slowing with cerebral activity. No focal slowing.   No focal generalize Date Action Dose Route User    12/14/2020 1531 New Bag 1 g Intravenous Prema Guerrero RN      dilTIAZem (cardIZEM CD) 24 hr cap 180 mg     Date Action Dose Route User    12/15/2020 5210 Given by Other 180 mg Oral Ellie Irena RN      fenofibrate micr

## 2020-12-15 NOTE — OCCUPATIONAL THERAPY NOTE
OCCUPATIONAL THERAPY EVALUATION - INPATIENT     Room Number: 316/316-A  Evaluation Date: 12/15/2020  Type of Evaluation: Initial  Presenting Problem: (episode of unresponsive, acute cystitis)    Physician Order: IP Consult to Occupational Therapy  Reason f to return to prior level of function. The patient's Approx Degree of Impairment: 59.67% has been calculated based on documentation in the [facility] '6 clicks' Inpatient Daily Activity Short Form.   Research supports that patients with this level of impairmen perseverating on this topic throughout the eval.     OCCUPATIONAL THERAPY EXAMINATION      OBJECTIVE  Precautions: Bed/chair alarm;Cervical brace  Fall Risk: High fall risk    PAIN ASSESSMENT  Ratin          ACTIVITY TOLERANCE  Fair limited by [employer]

## 2020-12-15 NOTE — PROCEDURES
[address] [facility] [address]      PATIENT'S NAME: Socorro Rivers   ATTENDING PHYSICIAN: Tobie Sandifer, MD   PATIENT ACCOUNT #: [de-identified] LOCATION: [address] [place] [address] #: [id number] DATE OF BIRTH: [birth year]

## 2020-12-16 NOTE — PLAN OF CARE
VSS. Some confusion/poor historian. Heel dressings changed. Up to chair for lunch after PT, poor appetite. Purewick in place. Continue IV abx.     Problem: Patient Centered Care  Goal: Patient preferences are identified and integrated in the patient's plan assist patient to increase activity and self care with guidance from PT/OT  - Encourage visually impaired, hearing impaired and aphasic patients to use assistive/communication devices  Outcome: Progressing     Problem: GENITOURINARY - ADULT  Goal: Absence

## 2020-12-16 NOTE — DIETARY NOTE
ADULT NUTRITION INITIAL ASSESSMENT    Pt is at moderate nutrition risk. Pt does not meet malnutrition criteria.       RECOMMENDATIONS TO MD:  See Nutrition Intervention     NUTRITION DIAGNOSIS/PROBLEM:  Unintentional weight loss related to suspected  decre 149.9 cm (4' 11\")  WT: 62.8 kg (138 lb 8 oz)   BMI: Body mass index is 27.97 kg/m². BMI CLASSIFICATION: 25-29.9 kg/m2 - overweight  IBW: 95 lbs        145% IBW  Usual Body Wt: see weight history.          92% of wt from 1 month ago, 87% of weight from 6 m CO2 29.0 28.0 26.0   OSMOCALC 292 293 289     NUTRITION RELATED PHYSICAL FINDINGS:  - Body Fat/Muscle Mass: well nourished and No wasting noted per visual exam.  - Fluid Accumulation: none per RN documentation   - Skin Integrity: reddened, breakdown and

## 2020-12-16 NOTE — PROGRESS NOTES
[facility]    Progress Note    Dulce Rios Patient Status:  Inpatient    1930 MRN [id number]   Location [facility] 3W/SW Attending Casey Nelson MD   Hosp Day # 3 PCP Zeenat Castaneda MD, MD       SUBJECTIVE:    Feel Jesus Alberto Murcia MD on 12/13/2020 at 4:47 PM     Finalized by (CST): Jesus Ablerto Murcia MD on 12/13/2020 at 4:49 PM            Meds:     •  ondansetron HCl (ZOFRAN) injection 4 mg, 4 mg, Intravenous, Q4H PRN    •  cefTRIAXone Sodium (ROCEPHIN) 1 g in sodium ch ceftriaxone pending culture results - klebsiella growing  - pt/ot eval -recs for SIM  - echo with normal EF     Type III odontoid fracture of C2 vertebrae with associated compression of right vertebral artery 2/2 Fall  -was seen by NS  -no surgery recommen

## 2020-12-16 NOTE — PROGRESS NOTES
[facility]    Progress Note    Myke Callejas Patient Status:  Inpatient    1930 MRN [id number]   Location [facility] 3W/SW Attending Damion Dutton MD   Hosp Day # 2 PCP Gary Carrillo MD, MD       Subjective:   Vesna Joyner feels well otherwise  SKIN: denies any unusual skin lesions or rashes  EYES: no visual complaints or deficits  HEENT: denies nasal congestion, sinus pain or sore throat; hearing loss negative  RESPIRATORY: denies shortness of breath, wheezing or cough   CA

## 2020-12-16 NOTE — PHYSICAL THERAPY NOTE
PHYSICAL THERAPY TREATMENT NOTE - INPATIENT   Room Number: 697/986-S       Presenting Problem: R cerebellar CVA, episode of unresponsiveness    Problem List  Principal Problem:    Episode of unresponsiveness  Active Problems:    Acute cystitis without hema training;Neuromuscular re-educate;Range of motion;Strengthening;Stoop training;Stair training;Transfer training;Balance training    SUBJECTIVE  \"yes, yes, yes\" Pt responds with one word responses to short questions.      OBJECTIVE  Precautions: Bed/chair able to demonstrate supine - sit EOB @ level: minimum assistance      Goal #1   Current Status  supine to sit, mod A   Goal #2 Patient is able to demonstrate transfers EOB to/from Chair/Wheelchair at assistance level: minimum assistance with walker - rolli

## 2020-12-16 NOTE — CM/SW NOTE
08: 30AM  SW contacted pt's son, Dodie Zimmer, via phone. He confirmed that he is agreeable to SNF at [address] [place] [address] via Aidin and requested they submit for insurance authorization.     09:20AM  Received call from Joseph jacobs/

## 2020-12-17 NOTE — DISCHARGE PLANNING
I called and gave report to nurse Regi Rowland at [facility] rehab facility. Patient's physical and history were relayed to nursing staff and included past medical history, admitting diagnosis of AMS and UTI now taking oral antibiotics for 4 more days.

## 2020-12-17 NOTE — CM/SW NOTE
Received notice that pt is medically cleared for d/c today. YUKI contacted [facility]/ [facility] - confirmed they can accept pt today at approx 12PM.    YUKI updated pt's RN and D/C RN Fer Sheets. YUKI also contacted pt's son, Noel Hendricks, and left [facility] w/ update.

## 2020-12-17 NOTE — DISCHARGE SUMMARY
[facility]    Discharge Summary    Marylene Highman Patient Status:  Inpatient    1930 MRN [id number]   Location [facility] 3W/SW Attending Kalpana Farris MD   Hosp Day # 4 PCP oRel Stark MD, MD     Date of Admis extremities normal, atraumatic, no cyanosis or edema  Psychiatric: calm        History of Present Illness:   Per Dr. Adrienne Bey is a(n) 80year old female with a PMH of CHF, HTN, recent hospital stay with cervical fracture who presents with an lasix    Hypothyroidism  -Continue Synthroid     Hypertension with hypertensive heart disease  -con't meds bp well controlled    Consultations:   Neurology  Endocrinology    Procedures: n/a    Complications: n/a    Discharge Condition: Good    Discharge Me DURICEF      Take 1 capsule (500 mg total) by mouth 2 (two) times daily. Quantity: 8 capsule  Refills: 0     D 1000 25 MCG (1000 UT) Caps  Generic drug: cholecalciferol      Take 1,000 Units by mouth daily.    Refills: 0     Dilt- MG Cp24  Generic d Follow-up with pcp in 1 week    Follow up Labs: cortisol level  F/u IGF-4 level pending on dc     Other Discharge Instructions: monitor inr at rehab    [facility], MD  12/17/2020  8:11 AM    > 35 min

## 2020-12-18 NOTE — PAYOR COMM NOTE
--------------  DISCHARGE REVIEW    Payor: [employer] AD[employer] PPO  Subscriber #:  [id number]  Authorization Number: [id number]    Admit date: 12/13/20  Admit time:  1741  Discharge Date: 12/17/2020  2:05 PM     Admitting Physician: Dawson Estevez MD  Atte headache type     Intractable nausea and vomiting     Episode of unresponsiveness     Acute cystitis without hematuria      Reason for Admission:   Acute Metabolic Encephalopathy  Type III odontoid fracture of C2 vertebrae with ass.  Compression of rt. vert OP either with endo f/u or with pcp  - pit adenoma seen in 4/2020 on CT head, d.w son, no further w/u given her age     Chronic afib/Ch.  Diastolic heart failure  -con't coreg, cardizem, amiodarone  -coumadin  -monitor INR  -cont lasix    Hypothyroidism  -C

## 2021-01-01 ENCOUNTER — ANTI-COAG VISIT (OUTPATIENT)
Dept: INTERNAL MEDICINE CLINIC | Facility: CLINIC | Age: 86
End: 2021-01-01

## 2021-01-01 ENCOUNTER — APPOINTMENT (OUTPATIENT)
Dept: GENERAL RADIOLOGY | Facility: HOSPITAL | Age: 86
End: 2021-01-01
Attending: EMERGENCY MEDICINE
Payer: MEDICARE

## 2021-01-01 ENCOUNTER — SNF VISIT (OUTPATIENT)
Dept: INTERNAL MEDICINE CLINIC | Facility: SKILLED NURSING FACILITY | Age: 86
End: 2021-01-01

## 2021-01-01 ENCOUNTER — INITIAL APN SNF VISIT (OUTPATIENT)
Dept: INTERNAL MEDICINE CLINIC | Facility: SKILLED NURSING FACILITY | Age: 86
End: 2021-01-01

## 2021-01-01 ENCOUNTER — APPOINTMENT (OUTPATIENT)
Dept: CT IMAGING | Facility: HOSPITAL | Age: 86
End: 2021-01-01
Attending: EMERGENCY MEDICINE
Payer: MEDICARE

## 2021-01-01 ENCOUNTER — HOSPITAL ENCOUNTER (OUTPATIENT)
Facility: HOSPITAL | Age: 86
Setting detail: OBSERVATION
LOS: 4 days | Discharge: NURSING, REHAB OR LONG-TERM CARE | End: 2021-01-01
Attending: EMERGENCY MEDICINE | Admitting: HOSPITALIST
Payer: MEDICARE

## 2021-01-01 ENCOUNTER — HOSPITAL ENCOUNTER (OUTPATIENT)
Dept: CT IMAGING | Facility: HOSPITAL | Age: 86
Discharge: HOME | End: 2021-01-01
Attending: NEUROLOGICAL SURGERY
Payer: MEDICARE

## 2021-01-01 ENCOUNTER — TELEPHONE (OUTPATIENT)
Dept: CARDIOLOGY | Age: 86
End: 2021-01-01

## 2021-01-01 ENCOUNTER — ANCILLARY ORDERS (OUTPATIENT)
Dept: CARDIOLOGY | Age: 86
End: 2021-01-01

## 2021-01-01 ENCOUNTER — TELEPHONE (OUTPATIENT)
Dept: INTERNAL MEDICINE CLINIC | Facility: CLINIC | Age: 86
End: 2021-01-01

## 2021-01-01 ENCOUNTER — ANCILLARY PROCEDURE (OUTPATIENT)
Dept: CARDIOLOGY | Age: 86
End: 2021-01-01
Attending: INTERNAL MEDICINE

## 2021-01-01 VITALS
TEMPERATURE: 97 F | DIASTOLIC BLOOD PRESSURE: 62 MMHG | OXYGEN SATURATION: 98 % | BODY MASS INDEX: 25 KG/M2 | HEART RATE: 85 BPM | SYSTOLIC BLOOD PRESSURE: 108 MMHG | RESPIRATION RATE: 20 BRPM | WEIGHT: 123.63 LBS

## 2021-01-01 VITALS
RESPIRATION RATE: 16 BRPM | DIASTOLIC BLOOD PRESSURE: 66 MMHG | HEART RATE: 79 BPM | TEMPERATURE: 98 F | HEIGHT: 59 IN | BODY MASS INDEX: 27.04 KG/M2 | SYSTOLIC BLOOD PRESSURE: 123 MMHG | WEIGHT: 134.13 LBS | OXYGEN SATURATION: 95 %

## 2021-01-01 DIAGNOSIS — M54.50 ACUTE BILATERAL LOW BACK PAIN WITHOUT SCIATICA: ICD-10-CM

## 2021-01-01 DIAGNOSIS — I50.9 CONGESTIVE HEART FAILURE, UNSPECIFIED HF CHRONICITY, UNSPECIFIED HEART FAILURE TYPE (HCC): ICD-10-CM

## 2021-01-01 DIAGNOSIS — E78.9 DISORDER OF LIPID METABOLISM: ICD-10-CM

## 2021-01-01 DIAGNOSIS — E87.6 HYPOKALEMIA: ICD-10-CM

## 2021-01-01 DIAGNOSIS — I48.91 ATRIAL FIBRILLATION, UNSPECIFIED TYPE (HCC): ICD-10-CM

## 2021-01-01 DIAGNOSIS — Z95.810 ICD (IMPLANTABLE CARDIOVERTER-DEFIBRILLATOR) IN PLACE: ICD-10-CM

## 2021-01-01 DIAGNOSIS — N30.00 ACUTE CYSTITIS WITHOUT HEMATURIA: ICD-10-CM

## 2021-01-01 DIAGNOSIS — R40.4 ALTERED AWARENESS, TRANSIENT: ICD-10-CM

## 2021-01-01 DIAGNOSIS — Z79.01 LONG TERM (CURRENT) USE OF ANTICOAGULANTS: ICD-10-CM

## 2021-01-01 DIAGNOSIS — E03.9 HYPOTHYROIDISM, UNSPECIFIED TYPE: ICD-10-CM

## 2021-01-01 DIAGNOSIS — I10 HYPERTENSION, BENIGN: ICD-10-CM

## 2021-01-01 DIAGNOSIS — S12.100D CLOSED ODONTOID FRACTURE WITH ROUTINE HEALING, SUBSEQUENT ENCOUNTER: ICD-10-CM

## 2021-01-01 DIAGNOSIS — I50.9 CHRONIC CONGESTIVE HEART FAILURE, UNSPECIFIED HEART FAILURE TYPE (HCC): ICD-10-CM

## 2021-01-01 DIAGNOSIS — I48.20 CHRONIC ATRIAL FIBRILLATION (HCC): ICD-10-CM

## 2021-01-01 DIAGNOSIS — Z23 NEED FOR VACCINATION: ICD-10-CM

## 2021-01-01 DIAGNOSIS — R60.0 EDEMA OF LEFT LOWER EXTREMITY: ICD-10-CM

## 2021-01-01 DIAGNOSIS — R79.1 SUPRATHERAPEUTIC INR: ICD-10-CM

## 2021-01-01 DIAGNOSIS — I49.01 VENTRICULAR FIBRILLATION (CMD): ICD-10-CM

## 2021-01-01 DIAGNOSIS — I48.21 PERMANENT ATRIAL FIBRILLATION (CMD): ICD-10-CM

## 2021-01-01 DIAGNOSIS — I50.22 CHRONIC SYSTOLIC CONGESTIVE HEART FAILURE (CMD): Primary | ICD-10-CM

## 2021-01-01 DIAGNOSIS — S12.100G CLOSED ODONTOID FRACTURE WITH DELAYED HEALING, SUBSEQUENT ENCOUNTER: ICD-10-CM

## 2021-01-01 DIAGNOSIS — S12.100A CLOSED DISPLACED FRACTURE OF SECOND CERVICAL VERTEBRA, UNSPECIFIED FRACTURE MORPHOLOGY, INITIAL ENCOUNTER (HCC): ICD-10-CM

## 2021-01-01 DIAGNOSIS — I50.22 CHRONIC SYSTOLIC CONGESTIVE HEART FAILURE (HCC): ICD-10-CM

## 2021-01-01 DIAGNOSIS — R09.89: ICD-10-CM

## 2021-01-01 DIAGNOSIS — N30.00 ACUTE CYSTITIS WITHOUT HEMATURIA: Primary | ICD-10-CM

## 2021-01-01 DIAGNOSIS — Z02.9 DISCHARGE PLANNING ISSUES: ICD-10-CM

## 2021-01-01 DIAGNOSIS — R41.0 CONFUSION: ICD-10-CM

## 2021-01-01 LAB
ANION GAP SERPL CALC-SCNC: 3 MMOL/L (ref 0–18)
ANION GAP SERPL CALC-SCNC: 6 MMOL/L (ref 0–18)
ANION GAP SERPL CALC-SCNC: 8 MMOL/L (ref 0–18)
BASOPHILS # BLD AUTO: 0.02 X10(3) UL (ref 0–0.2)
BASOPHILS # BLD AUTO: 0.02 X10(3) UL (ref 0–0.2)
BASOPHILS # BLD AUTO: 0.03 X10(3) UL (ref 0–0.2)
BASOPHILS # BLD AUTO: 0.03 X10(3) UL (ref 0–0.2)
BASOPHILS # BLD AUTO: 0.04 X10(3) UL (ref 0–0.2)
BASOPHILS NFR BLD AUTO: 0.3 %
BASOPHILS NFR BLD AUTO: 0.3 %
BASOPHILS NFR BLD AUTO: 0.5 %
BASOPHILS NFR BLD AUTO: 0.5 %
BASOPHILS NFR BLD AUTO: 0.6 %
BILIRUB UR QL: NEGATIVE
BUN BLD-MCNC: 11 MG/DL (ref 7–18)
BUN BLD-MCNC: 11 MG/DL (ref 7–18)
BUN BLD-MCNC: 14 MG/DL (ref 7–18)
BUN BLD-MCNC: 15 MG/DL (ref 7–18)
BUN BLD-MCNC: 16 MG/DL (ref 7–18)
BUN/CREAT SERPL: 11.9 (ref 10–20)
BUN/CREAT SERPL: 14.2 (ref 10–20)
BUN/CREAT SERPL: 15.7 (ref 10–20)
BUN/CREAT SERPL: 17.5 (ref 10–20)
BUN/CREAT SERPL: 18.8 (ref 10–20)
CALCIUM BLD-MCNC: 7.3 MG/DL (ref 8.5–10.1)
CALCIUM BLD-MCNC: 7.3 MG/DL (ref 8.5–10.1)
CALCIUM BLD-MCNC: 7.7 MG/DL (ref 8.5–10.1)
CALCIUM BLD-MCNC: 7.9 MG/DL (ref 8.5–10.1)
CALCIUM BLD-MCNC: 8.1 MG/DL (ref 8.5–10.1)
CHLORIDE SERPL-SCNC: 102 MMOL/L (ref 98–112)
CHLORIDE SERPL-SCNC: 112 MMOL/L (ref 98–112)
CHLORIDE SERPL-SCNC: 114 MMOL/L (ref 98–112)
CO2 SERPL-SCNC: 23 MMOL/L (ref 21–32)
CO2 SERPL-SCNC: 24 MMOL/L (ref 21–32)
CO2 SERPL-SCNC: 25 MMOL/L (ref 21–32)
CO2 SERPL-SCNC: 26 MMOL/L (ref 21–32)
CO2 SERPL-SCNC: 28 MMOL/L (ref 21–32)
COLOR UR: YELLOW
CREAT BLD-MCNC: 0.63 MG/DL
CREAT BLD-MCNC: 0.7 MG/DL
CREAT BLD-MCNC: 0.8 MG/DL
CREAT BLD-MCNC: 1.13 MG/DL
CREAT BLD-MCNC: 1.18 MG/DL
DEPRECATED RDW RBC AUTO: 57.1 FL (ref 35.1–46.3)
DEPRECATED RDW RBC AUTO: 57.6 FL (ref 35.1–46.3)
DEPRECATED RDW RBC AUTO: 58.3 FL (ref 35.1–46.3)
DEPRECATED RDW RBC AUTO: 58.7 FL (ref 35.1–46.3)
DEPRECATED RDW RBC AUTO: 59.3 FL (ref 35.1–46.3)
EOSINOPHIL # BLD AUTO: 0 X10(3) UL (ref 0–0.7)
EOSINOPHIL NFR BLD AUTO: 0 %
ERYTHROCYTE [DISTWIDTH] IN BLOOD BY AUTOMATED COUNT: 15.2 % (ref 11–15)
ERYTHROCYTE [DISTWIDTH] IN BLOOD BY AUTOMATED COUNT: 15.4 % (ref 11–15)
ERYTHROCYTE [DISTWIDTH] IN BLOOD BY AUTOMATED COUNT: 15.5 % (ref 11–15)
GLUCOSE BLD-MCNC: 139 MG/DL (ref 70–99)
GLUCOSE BLD-MCNC: 88 MG/DL (ref 70–99)
GLUCOSE BLD-MCNC: 94 MG/DL (ref 70–99)
GLUCOSE BLD-MCNC: 95 MG/DL (ref 70–99)
GLUCOSE BLD-MCNC: 97 MG/DL (ref 70–99)
GLUCOSE BLDC GLUCOMTR-MCNC: 145 MG/DL (ref 70–99)
GLUCOSE UR-MCNC: NEGATIVE MG/DL
HAV IGM SER QL: 2 MG/DL (ref 1.6–2.6)
HCT VFR BLD AUTO: 34.3 %
HCT VFR BLD AUTO: 34.7 %
HCT VFR BLD AUTO: 35.2 %
HCT VFR BLD AUTO: 35.4 %
HCT VFR BLD AUTO: 39.8 %
HGB BLD-MCNC: 10.8 G/DL
HGB BLD-MCNC: 10.9 G/DL
HGB BLD-MCNC: 11 G/DL
HGB BLD-MCNC: 11.3 G/DL
HGB BLD-MCNC: 12.5 G/DL
IMM GRANULOCYTES # BLD AUTO: 0.04 X10(3) UL (ref 0–1)
IMM GRANULOCYTES # BLD AUTO: 0.05 X10(3) UL (ref 0–1)
IMM GRANULOCYTES # BLD AUTO: 0.06 X10(3) UL (ref 0–1)
IMM GRANULOCYTES # BLD AUTO: 0.07 X10(3) UL (ref 0–1)
IMM GRANULOCYTES # BLD AUTO: 0.12 X10(3) UL (ref 0–1)
IMM GRANULOCYTES NFR BLD: 0.7 %
IMM GRANULOCYTES NFR BLD: 0.8 %
IMM GRANULOCYTES NFR BLD: 1 %
IMM GRANULOCYTES NFR BLD: 1.3 %
IMM GRANULOCYTES NFR BLD: 1.5 %
INR BLD: 2.26 (ref 0.9–1.2)
INR BLD: 2.33 (ref 0.9–1.2)
INR BLD: 2.66 (ref 0.9–1.2)
INR BLD: 3.66 (ref 0.9–1.2)
INR BLD: 3.87 (ref 0.9–1.2)
INR BLD: 4.03 (ref 0.9–1.2)
INR: 2.8 (ref 0.8–1.2)
INR: 2.8 (ref 2–3)
INR: 3 (ref 0.8–1.2)
INR: 3.7 (ref 0.8–1.2)
INR: 4.1 (ref 0.8–1.2)
KETONES UR-MCNC: NEGATIVE MG/DL
LYMPHOCYTES # BLD AUTO: 0.81 X10(3) UL (ref 1–4)
LYMPHOCYTES # BLD AUTO: 0.93 X10(3) UL (ref 1–4)
LYMPHOCYTES # BLD AUTO: 0.99 X10(3) UL (ref 1–4)
LYMPHOCYTES # BLD AUTO: 1.06 X10(3) UL (ref 1–4)
LYMPHOCYTES # BLD AUTO: 1.11 X10(3) UL (ref 1–4)
LYMPHOCYTES NFR BLD AUTO: 12.8 %
LYMPHOCYTES NFR BLD AUTO: 13.9 %
LYMPHOCYTES NFR BLD AUTO: 16.4 %
LYMPHOCYTES NFR BLD AUTO: 17.5 %
LYMPHOCYTES NFR BLD AUTO: 19 %
MCH RBC QN AUTO: 31.6 PG (ref 26–34)
MCH RBC QN AUTO: 31.9 PG (ref 26–34)
MCH RBC QN AUTO: 32.1 PG (ref 26–34)
MCH RBC QN AUTO: 32.2 PG (ref 26–34)
MCH RBC QN AUTO: 32.8 PG (ref 26–34)
MCHC RBC AUTO-ENTMCNC: 31.1 G/DL (ref 31–37)
MCHC RBC AUTO-ENTMCNC: 31.4 G/DL (ref 31–37)
MCHC RBC AUTO-ENTMCNC: 31.4 G/DL (ref 31–37)
MCHC RBC AUTO-ENTMCNC: 31.5 G/DL (ref 31–37)
MCHC RBC AUTO-ENTMCNC: 32.1 G/DL (ref 31–37)
MCV RBC AUTO: 101.5 FL
MCV RBC AUTO: 101.7 FL
MCV RBC AUTO: 102 FL
MCV RBC AUTO: 102.1 FL
MCV RBC AUTO: 102.4 FL
MONOCYTES # BLD AUTO: 0.36 X10(3) UL (ref 0.1–1)
MONOCYTES # BLD AUTO: 0.38 X10(3) UL (ref 0.1–1)
MONOCYTES # BLD AUTO: 0.4 X10(3) UL (ref 0.1–1)
MONOCYTES # BLD AUTO: 0.44 X10(3) UL (ref 0.1–1)
MONOCYTES # BLD AUTO: 0.51 X10(3) UL (ref 0.1–1)
MONOCYTES NFR BLD AUTO: 6.2 %
MONOCYTES NFR BLD AUTO: 6.6 %
MONOCYTES NFR BLD AUTO: 6.8 %
MONOCYTES NFR BLD AUTO: 6.8 %
MONOCYTES NFR BLD AUTO: 7.1 %
NEUTROPHILS # BLD AUTO: 3.91 X10 (3) UL (ref 1.5–7.7)
NEUTROPHILS # BLD AUTO: 3.91 X10(3) UL (ref 1.5–7.7)
NEUTROPHILS # BLD AUTO: 4.26 X10 (3) UL (ref 1.5–7.7)
NEUTROPHILS # BLD AUTO: 4.26 X10(3) UL (ref 1.5–7.7)
NEUTROPHILS # BLD AUTO: 4.6 X10 (3) UL (ref 1.5–7.7)
NEUTROPHILS # BLD AUTO: 4.6 X10(3) UL (ref 1.5–7.7)
NEUTROPHILS # BLD AUTO: 4.88 X10 (3) UL (ref 1.5–7.7)
NEUTROPHILS # BLD AUTO: 4.88 X10(3) UL (ref 1.5–7.7)
NEUTROPHILS # BLD AUTO: 6.09 X10 (3) UL (ref 1.5–7.7)
NEUTROPHILS # BLD AUTO: 6.09 X10(3) UL (ref 1.5–7.7)
NEUTROPHILS NFR BLD AUTO: 72.9 %
NEUTROPHILS NFR BLD AUTO: 73.5 %
NEUTROPHILS NFR BLD AUTO: 75.7 %
NEUTROPHILS NFR BLD AUTO: 78.6 %
NEUTROPHILS NFR BLD AUTO: 78.7 %
NITRITE UR QL STRIP.AUTO: NEGATIVE
NT-PROBNP SERPL-MCNC: 2475 PG/ML (ref ?–450)
OSMOLALITY SERPL CALC.SUM OF ELEC: 289 MOSM/KG (ref 275–295)
OSMOLALITY SERPL CALC.SUM OF ELEC: 291 MOSM/KG (ref 275–295)
OSMOLALITY SERPL CALC.SUM OF ELEC: 293 MOSM/KG (ref 275–295)
OSMOLALITY SERPL CALC.SUM OF ELEC: 297 MOSM/KG (ref 275–295)
OSMOLALITY SERPL CALC.SUM OF ELEC: 297 MOSM/KG (ref 275–295)
PH UR: 5 [PH] (ref 5–8)
PLATELET # BLD AUTO: 172 10(3)UL (ref 150–450)
PLATELET # BLD AUTO: 176 10(3)UL (ref 150–450)
PLATELET # BLD AUTO: 192 10(3)UL (ref 150–450)
PLATELET # BLD AUTO: 200 10(3)UL (ref 150–450)
PLATELET # BLD AUTO: 239 10(3)UL (ref 150–450)
POTASSIUM SERPL-SCNC: 4 MMOL/L (ref 3.5–5.1)
POTASSIUM SERPL-SCNC: 4.2 MMOL/L (ref 3.5–5.1)
POTASSIUM SERPL-SCNC: 4.2 MMOL/L (ref 3.5–5.1)
PROT UR-MCNC: NEGATIVE MG/DL
PROTHROMBIN TIME: 24.6 SECONDS (ref 11.8–14.5)
PROTHROMBIN TIME: 25.2 SECONDS (ref 11.8–14.5)
PROTHROMBIN TIME: 27.9 SECONDS (ref 11.8–14.5)
PROTHROMBIN TIME: 35.8 SECONDS (ref 11.8–14.5)
PROTHROMBIN TIME: 37.4 SECONDS (ref 11.8–14.5)
PROTHROMBIN TIME: 38.6 SECONDS (ref 11.8–14.5)
RBC # BLD AUTO: 3.36 X10(6)UL
RBC # BLD AUTO: 3.39 X10(6)UL
RBC # BLD AUTO: 3.45 X10(6)UL
RBC # BLD AUTO: 3.48 X10(6)UL
RBC # BLD AUTO: 3.92 X10(6)UL
RBC #/AREA URNS AUTO: 18 /HPF
SARS-COV-2 RNA RESP QL NAA+PROBE: NOT DETECTED
SODIUM SERPL-SCNC: 138 MMOL/L (ref 136–145)
SODIUM SERPL-SCNC: 141 MMOL/L (ref 136–145)
SODIUM SERPL-SCNC: 142 MMOL/L (ref 136–145)
SODIUM SERPL-SCNC: 143 MMOL/L (ref 136–145)
SODIUM SERPL-SCNC: 143 MMOL/L (ref 136–145)
SP GR UR STRIP: 1.02 (ref 1–1.03)
UROBILINOGEN UR STRIP-ACNC: <2
WBC # BLD AUTO: 5.3 X10(3) UL (ref 4–11)
WBC # BLD AUTO: 5.8 X10(3) UL (ref 4–11)
WBC # BLD AUTO: 5.9 X10(3) UL (ref 4–11)
WBC # BLD AUTO: 6.5 X10(3) UL (ref 4–11)
WBC # BLD AUTO: 7.8 X10(3) UL (ref 4–11)
WBC #/AREA URNS AUTO: 723 /HPF

## 2021-01-01 PROCEDURE — 93793 ANTICOAG MGMT PT WARFARIN: CPT | Performed by: INTERNAL MEDICINE

## 2021-01-01 PROCEDURE — 99233 SBSQ HOSP IP/OBS HIGH 50: CPT | Performed by: HOSPITALIST

## 2021-01-01 PROCEDURE — 99308 SBSQ NF CARE LOW MDM 20: CPT | Performed by: NURSE PRACTITIONER

## 2021-01-01 PROCEDURE — 99226 SUBSEQUENT OBSERVATION CARE: CPT | Performed by: HOSPITALIST

## 2021-01-01 PROCEDURE — 1111F DSCHRG MED/CURRENT MED MERGE: CPT | Performed by: NURSE PRACTITIONER

## 2021-01-01 PROCEDURE — 70450 CT HEAD/BRAIN W/O DYE: CPT | Performed by: EMERGENCY MEDICINE

## 2021-01-01 PROCEDURE — 3078F DIAST BP <80 MM HG: CPT | Performed by: NURSE PRACTITIONER

## 2021-01-01 PROCEDURE — 3074F SYST BP LT 130 MM HG: CPT | Performed by: NURSE PRACTITIONER

## 2021-01-01 PROCEDURE — 72125 CT NECK SPINE W/O DYE: CPT | Performed by: NEUROLOGICAL SURGERY

## 2021-01-01 PROCEDURE — 93295 DEV INTERROG REMOTE 1/2/MLT: CPT | Performed by: INTERNAL MEDICINE

## 2021-01-01 PROCEDURE — 70498 CT ANGIOGRAPHY NECK: CPT | Performed by: EMERGENCY MEDICINE

## 2021-01-01 PROCEDURE — 99309 SBSQ NF CARE MODERATE MDM 30: CPT | Performed by: NURSE PRACTITIONER

## 2021-01-01 PROCEDURE — 99231 SBSQ HOSP IP/OBS SF/LOW 25: CPT | Performed by: REGISTERED NURSE

## 2021-01-01 PROCEDURE — 93296 REM INTERROG EVL PM/IDS: CPT | Performed by: INTERNAL MEDICINE

## 2021-01-01 PROCEDURE — 99222 1ST HOSP IP/OBS MODERATE 55: CPT | Performed by: HOSPITALIST

## 2021-01-01 PROCEDURE — 99217 OBSERVATION CARE DISCHARGE: CPT | Performed by: HOSPITALIST

## 2021-01-01 PROCEDURE — 99223 1ST HOSP IP/OBS HIGH 75: CPT | Performed by: REGISTERED NURSE

## 2021-01-01 PROCEDURE — 70496 CT ANGIOGRAPHY HEAD: CPT | Performed by: EMERGENCY MEDICINE

## 2021-01-01 PROCEDURE — 71045 X-RAY EXAM CHEST 1 VIEW: CPT | Performed by: EMERGENCY MEDICINE

## 2021-01-01 PROCEDURE — 99305 1ST NF CARE MODERATE MDM 35: CPT | Performed by: NURSE PRACTITIONER

## 2021-01-01 PROCEDURE — X1114 CARDIAC DEVICE HOME CHECK - REMOTE UNSCHEDULED: HCPCS | Performed by: INTERNAL MEDICINE

## 2021-01-01 RX ORDER — ONDANSETRON 4 MG/1
4 TABLET, ORALLY DISINTEGRATING ORAL EVERY 8 HOURS PRN
COMMUNITY

## 2021-01-01 RX ORDER — ONDANSETRON 2 MG/ML
4 INJECTION INTRAMUSCULAR; INTRAVENOUS EVERY 6 HOURS PRN
Status: DISCONTINUED | OUTPATIENT
Start: 2021-01-01 | End: 2021-01-01

## 2021-01-01 RX ORDER — SODIUM CHLORIDE 9 MG/ML
INJECTION, SOLUTION INTRAVENOUS CONTINUOUS
Status: DISCONTINUED | OUTPATIENT
Start: 2021-01-01 | End: 2021-01-01

## 2021-01-01 RX ORDER — ALPRAZOLAM 0.25 MG/1
0.25 TABLET ORAL 3 TIMES DAILY PRN
Qty: 10 TABLET | Refills: 0 | Status: SHIPPED | OUTPATIENT
Start: 2021-01-01

## 2021-01-01 RX ORDER — AMIODARONE HYDROCHLORIDE 200 MG/1
200 TABLET ORAL DAILY
Status: DISCONTINUED | OUTPATIENT
Start: 2021-01-01 | End: 2021-01-01

## 2021-01-01 RX ORDER — CARVEDILOL 12.5 MG/1
12.5 TABLET ORAL 2 TIMES DAILY WITH MEALS
Status: DISCONTINUED | OUTPATIENT
Start: 2021-01-01 | End: 2021-01-01

## 2021-01-01 RX ORDER — ACETAMINOPHEN 650 MG
TABLET, EXTENDED RELEASE ORAL DAILY
COMMUNITY

## 2021-01-01 RX ORDER — AMIODARONE HYDROCHLORIDE 200 MG/1
200 TABLET ORAL DAILY
Qty: 30 TABLET | Refills: 0 | Status: SHIPPED | OUTPATIENT
Start: 2021-01-01 | End: 2021-01-01 | Stop reason: SDUPTHER

## 2021-01-01 RX ORDER — AMIODARONE HYDROCHLORIDE 200 MG/1
200 TABLET ORAL DAILY
Qty: 90 TABLET | Refills: 0 | OUTPATIENT
Start: 2021-01-01

## 2021-01-01 RX ORDER — AMIODARONE HYDROCHLORIDE 200 MG/1
100 TABLET ORAL DAILY
Qty: 30 TABLET | Refills: 3 | Status: SHIPPED | OUTPATIENT
Start: 2021-01-01

## 2021-01-01 RX ORDER — LEVOTHYROXINE SODIUM 137 UG/1
137 TABLET ORAL
Status: DISCONTINUED | OUTPATIENT
Start: 2021-01-01 | End: 2021-01-01

## 2021-01-01 RX ORDER — AMIODARONE HYDROCHLORIDE 200 MG/1
200 TABLET ORAL DAILY
Qty: 90 TABLET | OUTPATIENT
Start: 2021-01-01

## 2021-01-01 RX ORDER — ONDANSETRON 4 MG/1
4 TABLET, ORALLY DISINTEGRATING ORAL EVERY 8 HOURS PRN
Status: DISCONTINUED | OUTPATIENT
Start: 2021-01-01 | End: 2021-01-01

## 2021-01-01 RX ORDER — ACETAMINOPHEN 325 MG/1
650 TABLET ORAL EVERY 6 HOURS PRN
Status: DISCONTINUED | OUTPATIENT
Start: 2021-01-01 | End: 2021-01-01

## 2021-01-01 RX ORDER — ALPRAZOLAM 0.25 MG/1
0.25 TABLET ORAL 3 TIMES DAILY PRN
Status: DISCONTINUED | OUTPATIENT
Start: 2021-01-01 | End: 2021-01-01

## 2021-01-01 RX ORDER — CEPHALEXIN 500 MG/1
500 CAPSULE ORAL 3 TIMES DAILY
Qty: 10 CAPSULE | Refills: 0 | Status: SHIPPED | OUTPATIENT
Start: 2021-01-01

## 2021-01-01 RX ORDER — LACTULOSE 10 G/15ML
10 SOLUTION ORAL 2 TIMES DAILY PRN
Qty: 300 ML | Refills: 0 | Status: SHIPPED | OUTPATIENT
Start: 2021-01-01

## 2021-01-01 RX ORDER — WARFARIN SODIUM 1 MG/1
1 TABLET ORAL
Status: COMPLETED | OUTPATIENT
Start: 2021-01-01 | End: 2021-01-01

## 2021-01-01 RX ORDER — HYDROCODONE BITARTRATE AND ACETAMINOPHEN 5; 325 MG/1; MG/1
1 TABLET ORAL EVERY 8 HOURS PRN
Qty: 20 TABLET | Refills: 0 | Status: SHIPPED | OUTPATIENT
Start: 2021-01-01

## 2021-01-01 RX ORDER — AMIODARONE HYDROCHLORIDE 200 MG/1
200 TABLET ORAL DAILY
Qty: 30 TABLET | Refills: 0 | OUTPATIENT
Start: 2021-01-01

## 2021-01-01 RX ORDER — AMIODARONE HYDROCHLORIDE 200 MG/1
200 TABLET ORAL DAILY
Qty: 90 TABLET | Refills: 2 | Status: SHIPPED | OUTPATIENT
Start: 2021-01-01

## 2021-01-01 RX ORDER — ONDANSETRON 2 MG/ML
4 INJECTION INTRAMUSCULAR; INTRAVENOUS ONCE
Status: COMPLETED | OUTPATIENT
Start: 2021-01-01 | End: 2021-01-01

## 2021-01-01 RX ORDER — METOCLOPRAMIDE HYDROCHLORIDE 5 MG/ML
5 INJECTION INTRAMUSCULAR; INTRAVENOUS EVERY 8 HOURS PRN
Status: DISCONTINUED | OUTPATIENT
Start: 2021-01-01 | End: 2021-01-01

## 2021-01-01 RX ORDER — FUROSEMIDE 20 MG/1
20 TABLET ORAL DAILY
Status: ON HOLD | COMMUNITY
End: 2021-01-01

## 2021-01-01 RX ORDER — VANCOMYCIN HYDROCHLORIDE 125 MG/1
125 CAPSULE ORAL DAILY
Qty: 7 CAPSULE | Refills: 0 | Status: SHIPPED | OUTPATIENT
Start: 2021-01-01

## 2021-01-01 RX ORDER — DILTIAZEM HYDROCHLORIDE 180 MG/1
180 CAPSULE, EXTENDED RELEASE ORAL DAILY
Status: DISCONTINUED | OUTPATIENT
Start: 2021-01-01 | End: 2021-01-01

## 2021-01-04 NOTE — PROGRESS NOTES
Myke Callejas  : 1930  Age [address]year old  female patient is admitted to [facility] 20 for rehab and strengthening     Admitted to [facility] on: 20 to 20     Chief complaint: Acute cystitis without hematuria ,Episode of [facility]/PT/OT/RN/SW,patient would benefit from [facility] -Reviewed and updated     Current Outpatient Medications   Medication Sig Dispense Refill   • ALPRAZolam 0.25 MG Oral Tab Take 1 tablet (0.25 mg total) by mouth 3 (three) times catrina kg/m²       REVIEW OF SYSTEMS:    Patient confused lying in bed, trying to remove the neck collar. Oriented to self. VSS. Patient not participating well in therapy, reports feeling very tired.      Physical Exam:   General appearance: alert, appears frail a lasix    Hypothyroidism  -Continue Synthroid     Hypertension with hypertensive heart disease  -con't meds bp well controlled    This is a 25 minute visit and greater than 50% of the time was spent counseling the patient and/or coordinating care.     Cheyanne Pleitez

## 2021-01-28 NOTE — TELEPHONE ENCOUNTER
Today's INR 4.1  Goal 2.3  Protocol hold 1 - 2 doses and reduce by 10-15%  Actual, holding 1 dose and decrease 16.7%

## 2021-01-29 PROBLEM — R40.4 ALTERED AWARENESS, TRANSIENT: Status: ACTIVE | Noted: 2021-01-01

## 2021-01-29 PROBLEM — Z71.89 ADVANCE CARE PLANNING: Status: ACTIVE | Noted: 2021-01-01

## 2021-01-29 PROBLEM — Z71.89 GOALS OF CARE, COUNSELING/DISCUSSION: Status: ACTIVE | Noted: 2021-01-01

## 2021-01-29 NOTE — PROGRESS NOTES
[facility] Liaison received referral for community  services. Liaison sent for insurance verification. Addendum 2/1 2:18pm:   [facility] can accept depending on DC location. Once DC is set, Liaison will follow up appropriately.      Melissa Oliveira

## 2021-01-29 NOTE — PROGRESS NOTES
01/29/21 1500   Pressure Ulcer 01/29/21 Leg Lower; Outer;Left Stage II   Date First Assessed: 01/29/21   Location: Leg  Orientation: Lower; Outer;Left  Staging: Stage II  Pre-existing: Yes   Wound photographed/measured Yes   State of Healing Early/partial Clean; Intact;Painful;Red;Purple   Geri-wound Assessment Clean;Dry;Fragile;Painful;Pink   Wound Length 3.0cm   Wound Width 3.5cm   Drainage Amount None   Pressure Ulcer 01/29/21 Foot Outer;Right Stage I   Date First Assessed: 01/29/21   Location: Foot  [place]

## 2021-01-29 NOTE — PROGRESS NOTES
S Pt was resting but not talkative. Pt's son was present and attentive at bedside. O Pt arrived on Stroke Alert earlier, which was subsequently cancelled. A Provided non-anxious presence, prayer. P Will put request in for Yazidi communion.      01/29/

## 2021-01-29 NOTE — ED PROVIDER NOTES
Patient Seen in: [facility] AND [facility] Emergency Department    History   Patient presents with:  Altered Mental Status    Stated Complaint: AMS     HPI    80-year-old female with past medical history of hypertension, CHF, hypothyroid, atrial fibrillation on am Solution,  Take 10 g by mouth 2 (two) times daily. Miconazole Nitrate 2 % External Powder,  Apply topically daily. Warfarin Sodium 1 MG Oral Tab,  Take 0.5 mg by mouth daily.      cholecalciferol (D 1000) 25 MCG (1000 UT) Oral Cap,  Take 1,000 Units confused. HEENT: Dry MM. Head: Normocephalic. Neck: In Aspen collar. Eyes: No injection. Cardiovascular: Irregularly irregular. Pulmonary/Chest: Effort normal. CTAB. Abdominal: Soft. Nontender. Musculoskeletal: No gross deformity.   Neurological: Adjustment of the mA and/or kV was done based on the patient's  size. Iterative reconstruction technique for dose reduction was employed. FINDINGS: ALIGNMENT: The anatomic cervical lordosis is preserved.  BONES: Subacute type III odontoid fracture with ap left and moderate right neural foraminal with mild spinal canal stenosis. C7-T1: Mild disc and uncovertebral joint related degeneration, which results in mild bilateral neural foraminal stenosis, but no significant spinal canal compromise.          Vandana Patel airspace consolidation.     Dictated by (CST): Sabina Mckeon MD on 1/29/2021 at 12:50 PM     Finalized by (CST): Sabina Mckeon MD on 1/29/2021 at 12:54 PM          Ct Stroke Brain (no Iv)(cpt=70450)    Result Date: 1/29/2021  PROCEDURE: CT STROKE BRAIN (N microvascular ischemic disease without acute intracranial abnormality. 0.9 cm hyperdense pituitary mass is unchanged suggestive of a pituitary macroadenoma.   If clinically indicated, nonemergent pituitary MRI can further characterize based on clinical dis significant stenosis or dissection. LEFT INTERNAL CAROTID: No hemodynamically significant stenosis or dissection. Mild non flow-limiting atherosclerosis.    VERTEBRAL ARTERIES: RIGHT: Subacute-appearing non flow-limiting dissection involving the proximal injury/dissection involving the V3 segment of the right vertebral artery adjacent to a subacute type III odontoid fracture. Dissection appears improved/partially healed as compared with prior CT angiography in December, 2020.  Right vertebral artery is wid sent - will admit for ongoing management. PCP Dr. Dawna Rosales, graciously admitted to coverage Dr. Alesha Chavarria.  Patient with recent admission for similar and son alluding to being amenable to [address] discussion for which palliative consult placed an graciously evalua

## 2021-01-29 NOTE — PROGRESS NOTES
This  reported for Stroke Alert. Family not present, no opportunity for interaction.   Will follow up.   01/29/21 1150   Clinical Encounter Type   Visited With Patient not available

## 2021-01-29 NOTE — H&P
[facility]    PATIENT'S NAME: Mini North   ATTENDING PHYSICIAN: Angela Hoffmann MD   PATIENT ACCOUNT#:   [id number]    LOCATION:  [place] [address]  MEDICAL RECORD #:   [id number]       YOB: 1930  ADMISSION DATE:       01/29/2 disorder, lumbar spinal stenosis. She had history of compression fractures and pedicle fractures of lumbar spine vertebra. PAST SURGICAL HISTORY:  Bilateral total hip arthroplasties and cholecystectomy.     MEDICATIONS:  Please see medication reconcilia aspiration precautions, follow her INR, hold Coumadin. Further recommendations to follow. Wound service consult.     Dictated By Awa Charles MD  d: 01/29/2021 13:51:22  t: 01/29/2021 14:23:10  Job [id number]  FB/

## 2021-01-29 NOTE — CM/SW NOTE
Received MDO for community palliative care. SUSIE spoke w/Amanda in [facility] dept on referral.    CM/SW to remain available for support and/or discharge planning.     Mishel Orozco RN, [facility]    Ext. [phone]

## 2021-01-29 NOTE — CONSULTS
[address] Patient Status:  Inpatient    1930 MRN [id number]   Location [facility] 1W Attending Ingris Mcmillan MD   Hosp Day # 0 PCP Felicity Arredondo MD, MD     Date of Co Thyroid disease    Afib  H/o Type III odonoid fracture of C2  Pituitary macroadenoma  Dementia  Past Surgical History:   Procedure Laterality Date   • CATARACT EXTRACTION W/  INTRAOCULAR LENS IMPLANT Right 4/11/16    RJ-with general anesthesia, Faith from wound, takes Tylenol prn at home which helps, Doesn't tolerate pain meds well per son as they cause nausea   Non-verbal signs of pain present: NO  Constipation: yes, chronic issue.  Takes Lactulose BID at home which works well  Diarrhea: denies  Nausea was called immediately at 1209 hours to the Emergency department and discussed with Dr. Dara Cheng.      Dictated by (CST): January Simpson MD on 1/29/2021 at 12:04 PM     Finalized by (CST): January Simpson MD on 1/29/2021 at 12:10 PM          Ct Stroke Cta Brain/ct appearing  HEENT: No focal deficits. +C-collar intact  Cardiac: Irregular rate and rhythm, S1, S2 normal, no murmur, rub or gallop. Lungs: Clear without wheezes, rales, rhonchi or dullness. Normal excursions and effort.   Abdomen: Soft, non-tender, normal assistance with arising symptom management needs, extra layer of support, facilitate GOC/ACP discussions, ensure GOC are respected throughout healthcare continuum and assist with transition to hospice care when appropriate.       I explored Stephens  Sample adequately.     Procedures:  No intubation  No g-tube    Assessment/Recommendations:    Acute cystitis without hematuria  -tx per MDs    Encephalopathy with underlying dementia    Dehydration  -IVF per MDs    Afib  -tx per MDs    Type III odontoid C2 fractu [address] [place], [facility] [id number]  1/29/2021  3:38 PM

## 2021-01-30 NOTE — PLAN OF CARE
Problem: Patient Centered Care  Goal: Patient preferences are identified and integrated in the patient's plan of care  Description: Interventions:  - What would you like us to know as we care for you?  Home with son  - Provide timely, complete, and accura of injury  - Provide assistive devices as appropriate  - Consider OT/PT consult to assist with strengthening/mobility  - Encourage toileting schedule  Outcome: Progressing     Problem: PAIN - ADULT  Goal: Verbalizes/displays adequate comfort level or patie Progressing     Pt alert and oriented to self but confused to place, time and situation. Aspen collar in place at all times. Low blood pressure. Bowel movement 1/29. Voiding via purewick but also incontinent at times.  Patient is on bedrest. Pain managed wi

## 2021-01-30 NOTE — CM/SW NOTE
SW received MDO for POLST. Palliative Care is following patient. Per chart review, POLST was completed with Palliative Care on 1/29 and copy was sent to registration to be scanned into chart via Epic.     SW to remain available for discharge planning and fox

## 2021-01-30 NOTE — PROGRESS NOTES
[facility]    Progress Note    Azeb Munoz Patient Status:  Inpatient    1930 MRN [id number]   Location [facility] 4W/SW/SE Attending Dominic Soliz, [address] Day # 1 PCP Tara Rogers MD, MD       Subjective:   Irm 1.13 (H) 01/30/2021    BUN 16 01/30/2021     01/30/2021    K 4.0 01/30/2021     01/30/2021    CO2 25.0 01/30/2021    GLU 95 01/30/2021    CA 7.3 (L) 01/30/2021    ALB 3.4 11/06/2020    ALKPHO 82 11/06/2020    BILT 0.7 11/06/2020    TP 7.1 11/06 2020. Right vertebral artery is widely patent proximally and distally. 2. No intracranial flow limiting stenosis/large vessel occlusion. No intracranial aneurysm.  3. No hemodynamically significant stenosis or dissection involving the cervical carotid or l Assessment and Plan:      ASSESSMENT:    1. Urinary tract infection with acute encephalopathy. 2.       Hypovolemia and dehydration, elevated proBNP, but the patient is clinically dehydrated. 3.       Supratherapeutic INR.   4.       Sta

## 2021-01-30 NOTE — PLAN OF CARE
Patient is alert and oriented X1. Mckayla Kid in place, incontinent at times. VS WNL, on room air. Hypotensive overnight, fluids continued. No distress noted. Denies nausea or SOB. Aspen collar in place. CMS intact.  Dressings to BLE pressure ulcers covered and needs and preference and payer coverage in collaboration with the physician and health care team  - Communicate with and update the patient/family, physician, and health care team regarding progress on the discharge plan  - Arrange appropriate transportati ambulation using safe patient handling equipment as needed  - Ensure adequate protection for wounds/incisions during mobilization  - Obtain PT/OT consults as needed  - Advance activity as appropriate  - Communicate ordered activity level and limitations wi

## 2021-01-31 NOTE — PHYSICAL THERAPY NOTE
PHYSICAL THERAPY EVALUATION - INPATIENT     Room Number: 427/427-A  Evaluation Date: 1/31/2021  Type of Evaluation: Initial   Physician Order: PT Eval and Treat    Presenting Problem: acute cystitis without hematuria; AMS; N/V; headache   Reason for Nisha Teixeira pt largely w/c bound since fall in November resulting in c-spine fx. This is her third admission. Limited ambulation at [facility] due to wounds on feet.      Problem List  Principal Problem:    Acute cystitis without hematuria  Active Problems:    Altered awarene +  Static Standing: Not tested  Dynamic Standing: Not tested    ACTIVITY TOLERANCE  No adverse response to activity noted during session. AM-PAC '6-Clicks' INPATIENT SHORT FORM - BASIC MOBILITY  How much difficulty does the patient currently have. ..   - instructions provided to patient in preparation for discharge.    Goal #5   Current Status    Goal #6    Goal #6  Current Status

## 2021-01-31 NOTE — PROGRESS NOTES
[place] [facility] - [facility]    Progress Note    Anastacia Ospina Patient Status:  Inpatient    1930 MRN [id number]   Location [facility] 4W/SW/SE Attending Tomas Watt, [address] [facility] Day # 2 PCP Geraldine Hayes MD, MD       Subjective:   Rao Munoz  (H) 01/31/2021    CO2 23.0 01/31/2021    GLU 97 01/31/2021    CA 7.3 (L) 01/31/2021    ALB 3.4 11/06/2020    ALKPHO 82 11/06/2020    BILT 0.7 11/06/2020    TP 7.1 11/06/2020    AST 71 (H) 11/06/2020    ALT 45 11/06/2020    PTT 31.6 11/06/2020 after discharge, patient will require TBD.

## 2021-01-31 NOTE — PLAN OF CARE
Problem: Patient Centered Care  Goal: Patient preferences are identified and integrated in the patient's plan of care  Description: Interventions:  - What would you like us to know as we care for you?   - Provide timely, complete, and accurate informatio Provide assistive devices as appropriate  - Consider OT/PT consult to assist with strengthening/mobility  - Encourage toileting schedule  Outcome: Progressing     Problem: PAIN - ADULT  Goal: Verbalizes/displays adequate comfort level or patient's stated p alert and oriented x1, confused throughout the night. Bedrest. Aspen collar on at all times. Blue boots in place. BLE pressure ulcer dressing CDI. Voiding via purewick. Pain managed with tylenol PRN. Call light within reach.  Plan for either rehab to discha

## 2021-01-31 NOTE — PLAN OF CARE
Patient is alert and oriented X1. [place] Borne in place, incontinent at times. VS WNL, on room air. No distress noted. Denies nausea or SOB. Aspen collar in place. CMS intact. Dressings to BLE pressure ulcers covered and intact. Blue boots.  Fall precautions in collaboration with the physician and health care team  - Communicate with and update the patient/family, physician, and health care team regarding progress on the discharge plan  - Arrange appropriate transportation to post-acute venues  Outcome: Michelle Key equipment as needed  - Ensure adequate protection for wounds/incisions during mobilization  - Obtain PT/OT consults as needed  - Advance activity as appropriate  - Communicate ordered activity level and limitations with patient/family  Outcome: Progressing

## 2021-01-31 NOTE — DIETARY NOTE
ADULT NUTRITION INITIAL ASSESSMENT    Pt is at high nutrition risk. Pt meets severe malnutrition criteria.       CRITERIA FOR MALNUTRITION DIAGNOSIS:  Criteria for severe malnutrition diagnosis: chronic illness related to wt loss greater than 20% in 1 year meals   • dilTIAZem HCl ER  180 mg Oral Daily   • Levothyroxine Sodium  137 mcg Oral Before breakfast       LABS: reviewed  Recent Labs     01/29/21  1215 01/30/21  0742 01/31/21  0825   * 95 97   BUN 14 16 15   CREATSERUM 1.18* 1.13* 0.80   CA 7.9* Medical Food Supplements-RD added Ensure Enlive (350 calories/ 20 g protein each) Vanilla Daily and Chocolate Daily and Magic Cup (290 calories/ 9 g protein each) [place] Daily. Rational/use of oral supplements discussed.     - Vitamin and mineral supplement

## 2021-02-01 NOTE — CM/SW NOTE
CM received order from [facility] palliative care team for [facility] Palliative care on dc. CM made referral to [facility] palliative care team via secure chat. Tevin Coppola.  Kitty Bergman RN, BSN  Nurse   [phone]

## 2021-02-01 NOTE — CM/SW NOTE
SW was notified by Dr. Katt Gutierrez, the pt's son is interested in the pt. Going to rehab prior to discharging home. The pt. Has a hx. Of rehab at [facility] and he would like that facility again.   Referrals sent in Aidin and DON screen has been requested from

## 2021-02-01 NOTE — PAYOR COMM NOTE
--------------  ADMISSION REVIEW     Payor: [employer] ADV PPO  Subscriber #:  [id number]  Authorization Number: [id number]       ED Provider Notes      Patient seen in: [facility] Emergency Department    History   Patient presents with:  Altered HPI.    Physical Exam     ED Triage Vitals [01/29/21 1132]   /87   Pulse 70   Resp 24   Temp 97 °F (36.1 °C)   Temp src    SpO2 94 %   O2 Device None (Room air)       Current:/87   Pulse 70   Temp 97 °F (36.1 °C)   Resp 24   SpO2 94%         Ph field is clear. No large airspace consolidation.     Dictated by (CST): Ara Becker MD on 1/29/2021 at 12:50 PM     Finalized by (CST): Ara Becker MD on 1/29/2021 at 12:54 PM            Ct Stroke Brain (no Iv)(cpt=70450)    Result Date: 1/29/2021  P CONCLUSION:  Severe chronic microvascular ischemic disease without acute intracranial abnormality. 0.9 cm hyperdense pituitary mass is unchanged suggestive of a pituitary macroadenoma.   If clinically indicated, nonemergent pituitary MRI can further ch LEFT COMMON CAROTID: No hemodynamically significant stenosis or dissection. LEFT INTERNAL CAROTID: No hemodynamically significant stenosis or dissection. Mild non flow-limiting atherosclerosis.    VERTEBRAL ARTERIES: RIGHT: Subacute-appearing non flow-ahn CONCLUSION:  1. Subacute non flow-limiting injury/dissection involving the V3 segment of the right vertebral artery adjacent to a subacute type III odontoid fracture.   Dissection appears improved/partially healed as compared with prior CT angiography i ceftriaxone initiated and culture sent - will admit for ongoing management. PCP Dr. Jimenez Holbrook, graciously admitted to coverage Dr. Blanca Hamlin.  Patient with recent admission for similar and son alluding to being amenable to [address] discussion for which palliative c effort. HEART:  Regular rate and rhythm. S1, S2 auscultated. No murmur. ABDOMEN:  Soft, nondistended, no tenderness. Positive bowel sounds. EXTREMITIES:  No peripheral edema, clubbing, or cyanosis. NEUROLOGIC:  Motor and sensory intact.   SKIN:  Decu current state of illness, I anticipate that, after discharge, patient will require TBD.         URINE CULTURE, ROUTINE  Order: [id number]  Collected:  1/29/2021 12:43 Status:  Final result  Specimen Information: Urine, clean catch        URINE CULTURE >100,0

## 2021-02-01 NOTE — PHYSICAL THERAPY NOTE
PHYSICAL THERAPY TREATMENT NOTE - INPATIENT     Room Number: 427/427-A       Presenting Problem: acute cystitis without hematuria; AMS; N/V; headache     Problem List  Principal Problem:    Acute cystitis without hematuria  Active Problems:    Altered awar mechanics; Relaxation;Repositioning    BALANCE                                                                                                                     Static Sitting: Fair -  Dynamic Sitting: Poor +           Static Standing: Poor -  Dynamic Sta perform stand pivot transfer bed<> chair with MIN A and LRAD   Goal #3   Current Status SPT max A 1-2   Goal #4 Ambulation goals to be added as appropriate pending progress    Goal #4   Current Status N/A   Goal #5 Patient to demonstrate independence with

## 2021-02-01 NOTE — PALLIATIVE CARE NOTE
[place] [facility] - [facility]  Palliative Care Follow Up    Dangelo Amis Patient Status:  Inpatient    1930 MRN [id number]   Location [facility] 4W/SW/SE Attending Ranjit Harvey, [address] [facility] Day # 3 PCP Ravi More MD, MD     Date of C 24 hr cap 180 mg, 180 mg, Oral, Daily  •  levothyroxine (SYNTHROID, LEVOTHROID) tab, 137 mcg, Oral, Before breakfast  •  ondansetron (ZOFRAN-ODT) disintegrating tab 4 mg, 4 mg, Oral, Q8H PRN  No current outpatient medications on file.       Hematology:  Lab Scale : 30%    Palliative Care Goals of Care:  Discussed with Patient's son: yes  Patient's preference about sharing medical information: speak to pt's son June Melissa  Patient's decision making preferences: speak to pt's son June Melissa  Code status: DNAR/DNI-selective provided to patient/family today:  Yes       Palliative Care Follow Up:    A total of 15 mins were spent on this consult, which included all of the following:direct face to face contact, history taking, physical examination, and >50% was spent counseling an

## 2021-02-01 NOTE — PROGRESS NOTES
[place] [facility] - [facility]    Progress Note    Nicole Pérez Patient Status:  Inpatient    1930 MRN [id number]   Location [facility] 4W/SW/SE Attending Travon Huber, [address] [facility] Day # 3 PCP Paulo Hanson MD, MD       Subjective:   Elyssam  02/01/2021    CO2 24.0 02/01/2021    GLU 88 02/01/2021    CA 7.7 (L) 02/01/2021    ALB 3.4 11/06/2020    ALKPHO 82 11/06/2020    BILT 0.7 11/06/2020    TP 7.1 11/06/2020    AST 71 (H) 11/06/2020    ALT 45 11/06/2020    PTT 31.6 11/06/2020    INR services that will reasonably be expected to span two midnight's based on the clinical documentation in H+P. Based on patients current state of illness, I anticipate that, after discharge, patient will require TBD.

## 2021-02-01 NOTE — PLAN OF CARE
Problem: DISCHARGE PLANNING - CASE MANAGEMENT  Goal: Discharge to post-acute care or home with appropriate resources  Description: INTERVENTIONS:  - Conduct assessment to determine patient/family and health care team treatment goals, and need for post-ac social influences on pain and pain management  - Manage/alleviate anxiety  - Utilize distraction and/or relaxation techniques  - Monitor for opioid side effects  - Notify MD/LIP if interventions unsuccessful or patient reports new pain  - Anticipate increa

## 2021-02-01 NOTE — CDS QUERY
Dr. Brett Beckford: To answer this query:   1. Click \"Edit\" button on the toolbar. 2. Type an \"X\" in the bracket for diagnosis(s) that apply. (You may also add additional clinical details as you feel necessary to substantiate your response.)  3.  Click on \"SIG Clinical :  Flavia Fraser RN at [phone]  Thank You.     THIS FORM IS A PERMANENT PART OF THE MEDICAL RECORD

## 2021-02-02 NOTE — CM/SW NOTE
Patient failed inpatient criteria. Second level of review completed and supports observation. UR committee in agreement. PÉREZ  notice explained and  provided  to the patient . Copy placed in chart  Order for observation in place.    Peter CANTOR, U

## 2021-02-02 NOTE — PROGRESS NOTES
[facility]    Progress Note    Ofelia Leary Patient Status:  Inpatient    1930 MRN [id number]   Location [facility] 4W/SW/SE Attending Michael Contreras, [address] [facility] # 4 PCP Angelina Bradshaw MD, MD       Subjective:   Irm 02/02/2021    CO2 26.0 02/02/2021    GLU 94 02/02/2021    CA 8.1 (L) 02/02/2021    ALB 3.4 11/06/2020    ALKPHO 82 11/06/2020    BILT 0.7 11/06/2020    TP 7.1 11/06/2020    AST 71 (H) 11/06/2020    ALT 45 11/06/2020    PTT 31.6 11/06/2020    INR 2.33 (H) 0 require inpatient services that will reasonably be expected to span two midnight's based on the clinical documentation in H+P. Based on patients current state of illness, I anticipate that, after discharge, patient will require TBD.

## 2021-02-02 NOTE — CM/SW NOTE
[facility] the pt. And has been reserved in [place]. SW requested [facility] work on insurance [facility]. YUKI also notified Nati Eason with [facility] of the DC plan. Insurance 55 [facility] is pending.      [facility] ext [phone]

## 2021-02-02 NOTE — PLAN OF CARE
Patient is oriented to self with confusion. RA. SCDs and heel boots on bilaterally. Voiding freely using purewick. Q2h turns. Aspen collar in place at all times. Bilateral heel dressings changed.  Barrier cream applied to sacrum; mepilex to sacrum clean dry Implement non-pharmacological measures as appropriate and evaluate response  - Consider cultural and social influences on pain and pain management  - Manage/alleviate anxiety  - Utilize distraction and/or relaxation techniques  - Monitor for opioid side ef body part  Description: INTERVENTIONS:  - Support and protect limb and body alignment per provider's orders  - Instruct and reinforce with patient and family use of appropriate assistive device and precautions (e.g. spinal or hip dislocation precautions)

## 2021-02-02 NOTE — PLAN OF CARE
Problem: Patient Centered Care  Goal: Patient preferences are identified and integrated in the patient's plan of care  Description: Interventions:  - What would you like us to know as we care for you?   - Provide timely, complete, and accurate informatio Provide assistive devices as appropriate  - Consider OT/PT consult to assist with strengthening/mobility  - Encourage toileting schedule  Outcome: Progressing     Problem: PAIN - ADULT  Goal: Verbalizes/displays adequate comfort level or patient's stated p alert to self, frequently confused and needs to be reoriented. Aspen collar in place at all times. SCDs bilaterally. Voiding via purewick. Heel boots bilaterally in place. Q2 turns. ALISA pressure ulcers CDI. Tylenol for pain.  Plan to go to [address] [place] [address]

## 2021-02-02 NOTE — PAYOR COMM NOTE
--------------  CONTINUED STAY REVIEW    [employer] MEDICARE ADV PPO  Subscriber #:  [id number]  Authorization Number: [id number]      CONVERT TO OBS  MICAH    Still in house

## 2021-02-03 NOTE — DISCHARGE SUMMARY
Dc summary#[id number]  > 30 min spent on [address] [place] [address] Discharge Diagnoses: uti with sepsis    Lace+ Score: 83  59-90 High Risk  29-58 Medium Risk  0-28   Low Risk. TCM Follow-Up Recommendation:  LACE > 58:  High Risk of readmission after discharge from t

## 2021-02-03 NOTE — CM/SW NOTE
Ins auth obtained from [facility]. Per Dr. Latonia Lazcano pt is stable for dc today. Bed available at 3pm today. RN to call report to [facility] at [phone]. [facility] [phone]  BLS amb arranged for 3pm .   Pt is max assist with

## 2021-02-03 NOTE — PLAN OF CARE
Problem: Patient Centered Care  Goal: Patient preferences are identified and integrated in the patient's plan of care  Description: Interventions:  - What would you like us to know as we care for you? Live with son prior to admission.   Has to wear [place] Address psychosocial, clinical, and financial barriers to discharge as identified in assessment in conjunction with the patient/family and health care team  - Arrange appropriate level of post-acute services according to patient’s needs and preference and Problem: MUSCULOSKELETAL - ADULT  Goal: Return mobility to safest level of function  Description: INTERVENTIONS:  - Assess patient stability and activity tolerance for standing, transferring and ambulating w/ or w/o assistive devices  - Assist with trans promote bladder emptying  Outcome: Adequate for Discharge    Patient is alert and oriented x 1, confused, anxious at times. Patient is currently in room air, denies shortness of breathing nor chest pain.   Patient has a cervical collar on (till the 9th of

## 2021-02-03 NOTE — PLAN OF CARE
Pt alert to self only, poor sleep/rest overnight without agitation. [place] collar on AAT, realigned and skin underneath protected as excoriation is present at neck/clavicle area. Continues rocephin q24 for UTI, afebrile. Maintains room air.  Jaron score 12, post-acute care or home with appropriate resources  Description: INTERVENTIONS:  - Conduct assessment to determine patient/family and health care team treatment goals, and need for post-acute services based on payer coverage, community resources, and patie anxiety  - Utilize distraction and/or relaxation techniques  - Monitor for opioid side effects  - Notify MD/LIP if interventions unsuccessful or patient reports new pain  - Anticipate increased pain with activity and pre-medicate as appropriate  Outcome: P bladder  - Monitor intake/output and perform bladder scan as needed  - Follow urinary retention protocol/standard of care  - Consider collaborating with pharmacy to review patient's medication profile  - Implement strategies to promote bladder emptying  Janee Smiley

## 2021-02-04 NOTE — PROGRESS NOTES
Tamiruss Chen  : 1930  Age 80year old  female patient is admitted to [place] [address] for rehabilitation and strengthening       Chief complaint: AMS    HPI   72-year-old female with past medical history of hypertension, CHF, hypothyroid, atrial fi mucous membranes pink, moist, pharynx no exudate, no visible cerumen.   NECK: supple; FROM; no JVD, no TMG, no carotid bruits cervical collar maintained  RESPIRATORY:clear to percussion and auscultation  CARDIOVASCULAR: S1, S2 normal, RRR; no S3, no S4;   A

## 2021-02-05 NOTE — DISCHARGE SUMMARY
[facility]    PATIENT'S NAME: DAIANA LEZAMA   ATTENDING PHYSICIAN: Romero Martinez MD   PATIENT ACCOUNT#:   [id number]    LOCATION:  [address] [place] [address] RECORD #:   [id number]       YOB: 1930  ADMISSION DATE:       01/2 difficult to eat wearing C-collar. We will see how she does. Hopefully, on February 9 appointment with Dr. Luis Carmona, after that she will be able to remove the C-collar and perhaps able to go home when she is stronger versus going to hospice.   3.   Odontoid f daily. 16.   Zofran 4 mg every 8 hours as needed for nausea. Stop if muscle stiffness or pain. 17.   Iodine solution daily. 18.   Warfarin 0.5 mg daily. May need to be adjusted. 19.   Keflex 500 mg 3 times a day for 10 doses.   20.   Vancomycin prophy

## 2021-02-08 NOTE — PROGRESS NOTES
Satish Manley  : 1930  Age 80year old  female patient is admitted to [place] [address] for rehabilitation and strengthening     Chief complaint: AMS     HPI   55-year-old female with past medical history of hypertension, CHF, hypothyroid, atrial fib cerumen.   NECK: supple; FROM; no JVD, no TMG, no carotid bruits cervical collar maintained  RESPIRATORY:clear to percussion and auscultation  CARDIOVASCULAR: S1, S2 normal, RRR; no S3, no S4;   ABDOMEN:  normal active BS+, soft, nondistended; no organomega

## 2021-02-10 NOTE — PAYOR COMM NOTE
--------------  DISCHARGE REVIEW    Payor: [employer] MEDICARE ADV PPO  Subscriber #:  [id number]  Authorization Number: [id number]    Admit date: 1/29/21  Admit time:  1501  Discharge Date: 2/3/2021  4:03 PM     Admitting Physician: Tatianna Longoria MD  Attendi until hopefully, she is ready to go home. CONDITION ON DISCHARGE:      VITAL SIGNS:  On discharge, temperature 97.5, pulse 79, respiratory rate 16, blood pressure 123/66, saturation 95%. LUNGS:  Have occasional crackles. HEART:  Normal S1, S2.   Rosiland Fallow 200 mg daily. Please note, Cardiology will be responsible for any renewal, to continuing or discontinuing this medication. 4.   Amoxicillin 500 mg by mouth when goes for dental procedures. 5.   Os-Jeff 500 plus D 1 tablet twice a day.   6.   Coreg 12.5 mg

## 2021-02-11 NOTE — PROGRESS NOTES
Colin Roland  : 1930  Age 80year old  female patient is admitted to [place] [address] for rehabilitation and strengthening     Chief complaint: AMS     HPI   25-year-old female with past medical history of hypertension, CHF, hypothyroid, atrial fib carotid bruits cervical collar maintained  RESPIRATORY:clear to percussion and auscultation  CARDIOVASCULAR: S1, S2 normal, RRR; no S3, no S4;   ABDOMEN:  normal active BS+, soft, nondistended; no organomegaly, no masses; no bruits; nontender, no guarding,

## 2021-02-15 NOTE — PROGRESS NOTES
Yoselyn Parent  : 1930  Age 80year old  female patient is admitted to [place] [address] for rehabilitation and strengthening     Chief complaint: AMS     HPI   80-year-old female with past medical history of hypertension, CHF, hypothyroid, atrial fib bruits cervical collar maintained  RESPIRATORY:clear to percussion and auscultation  CARDIOVASCULAR: S1, S2 normal, RRR; no S3, no S4;   ABDOMEN:  normal active BS+, soft, nondistended; no organomegaly, no masses; no bruits; nontender, no guarding, no rebo

## 2021-02-18 NOTE — PROGRESS NOTES
Prince Showers  : 1930  Age 80year old  female patient is admitted to [place] [address] for rehabilitation and strengthening      Chief complaint: AMS     HPI   80-year-old female with past medical history of hypertension, CHF, hypothyroid, atrial fi pharynx no exudate, no visible cerumen.  C collar maintained  NECK: supple; FROM; no JVD, no TMG, no carotid bruits cervical collar maintained  RESPIRATORY:clear to percussion and auscultation  CARDIOVASCULAR: S1, S2 normal, RRR; no S3, no S4;   ABDOMEN:  n vitals  - continue diltiazem 180mg ER daily  - continue amiodarone 200mg daily  - continue carvedilol BID  - monitor     Hyperlipemia  - continue Tricor 48mg daily  - monitor    This is a 45 minute visit and greater than 50% of the time was spent counselin

## 2021-02-22 NOTE — PROGRESS NOTES
Puja [place]  : 1930  Age 80year old  female patient is admitted to [place] [address] for rehabilitation and strengthening     Chief complaint: AMS     HPI   30-year-old female with past medical history of hypertension, CHF, hypothyroid, atrial fib edema  NECK: supple; FROM; no JVD, no TMG, no carotid bruits  RESPIRATORY:right lower lobe crackles  CARDIOVASCULAR: S1, S2 normal, RRR; no S3, no S4; , no murmur  ABDOMEN:  normal active BS+, soft, nondistended; no organomegaly, no masses; no bruits; nont monitor     Hypertension  - Qshift vitals  - continue diltiazem 180mg ER daily  - continue amiodarone 200mg daily  - continue carvedilol BID  - monitor     Hyperlipemia  - continue Tricor 48mg daily  - monitor    This is a 45 minute visit and greater than

## 2021-04-26 NOTE — TELEPHONE ENCOUNTER
Jonah Morgan requesting 90 days refills for Amiodarone 200 mg. Please call. Thank you. Current Outpatient Medications   Medication Sig Dispense Refill   • amiodarone HCl (PACERONE) 200 MG Oral Tab Take 1 tablet by mouth daily.   6

## 2021-06-28 ENCOUNTER — TELEPHONE (OUTPATIENT)
Dept: CARDIOLOGY CLINIC | Facility: CLINIC | Age: 86
End: 2021-06-28

## 2024-03-18 NOTE — TELEPHONE ENCOUNTER
S/w pt and Dany Pop, son, states he received the letter. Pt verbalized understanding regarding new communication changes with [employer]. Pt aware of current INR range.  Pt is in agreement with change and is aware to call [facility] with any questions or c 73

## 2024-03-25 NOTE — PLAN OF CARE
Problem: Patient Centered Care  Goal: Patient preferences are identified and integrated in the patient's plan of care  Description: Interventions:  - What would you like us to know as we care for you?  Patient speaks Ar[place] but understands [place]   - Grace This patient's vancomycin therapy has been discontinued. Thank you for this consult; pharmacy will sign off now.    use assistive/communication devices  Outcome: Progressing     Problem: GENITOURINARY - ADULT  Goal: Absence of urinary retention  Description: INTERVENTIONS:  - Assess patient’s ability to void and empty bladder  - Monitor intake/output and perform bladder

## (undated) NOTE — IP AVS SNAPSHOT
Patient Demographics     Address  [address] [place] [address] Phone  [phone] (Home) *Preferred*      Emergency Contact(s)     Name Relation Home Work [address] [place] [address] Son [phone]        Allergies as of 12/17/2020  Review status Take 500 mg by mouth. Takes when going for dental procedures          carvedilol 12.5 MG Tabs  Commonly known as: COREG  Next dose due: Tonight with dinner      Take 12.5 mg by mouth 2 (two) times daily with meals.           Cefadroxil 500 MG Caps  Commonl Next dose due: Tonight       Take 1 tablet by mouth 2 (two) times daily. Warfarin Sodium 1 MG Tabs  Commonly known as: COUMADIN  Next dose due: Tonight       Take as directed.  If you are unsure how to take this medication, talk to your nurse or do SpO2  96 % Filed at 12/17/2020 0751      Patient's Most Recent Weight       Most Recent Value   Patient Weight  60.7 kg (133 lb 12.8 oz)         Lab Results Last 24 Hours      BASIC METABOLIC PANEL (8) [[id number]] (Abnormal)  Resulted: 12/17/20 0818, [employer]    Component Value Reference Range Flag Lab   PT 23.8 11.8 - 14.5 seconds [facility] [place] [facility])   Comment:        Elevations of the PT and/or INR in patients not  receiving anticoagulant therapy may be seen in  factor deficiency, vitamin K de Reference interval based on samples collected between 7 a.m. and   10 a.m. No reference intervals established for p.m. collections. Pediatric reference values are the same as adults (Acta Zackary Sins 1742;86:101-032).   This assay measures intact A Microbiology Results (All)     Procedure Component Value Units Date/Time    Urine Culture, Routine Once [[id number]]  (Abnormal)  (Susceptibility) Collected: 12/13/20 9219    Order Status: Completed Lab Status: Final result Updated: 12/14/20 4763    Huntere Vito Lombardo is a(n) 80year old female with a PMH of CHF, HTN, recent hospital stay with cervical fracture who presents with an episode of unresponsiveness. She arrived from home.   Per caregiver/medical records the patient was sitting in her chair and Blood pressure 130/62, pulse 60, temperature 97.8 °F (36.6 °C), temperature source Oral, resp. rate 15, weight 139 lb 1.8 oz (63.1 kg), SpO2 98 %, not currently breastfeeding.      Gen: No acute distress  Pulm: Lungs clear, normal respiratory effort  CV: He CONCLUSION:  1. No acute intracranial process by noncontrast CT technique. 2. 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   3. Nonspecific white matter changes inv CONCLUSION:  1. Stable subacute non flow-limiting injury/dissection involving the V3 segment of the right vertebral artery. Right vertebral artery is patent both proximally and distally.  2. No evidence of intracranial flow limiting stenosis/large vessel o -coumadin[LD.1]  -monitor INR[LD.2]     Hypothyroidism  -Continue Synthroid     Hypertension with hypertensive heart disease  -con't meds and monitor     DVT proph: coumadin    Dispo: pending neuro and pt recs        Ye Nelson MD  12/13/2020    **Cer Lia Cabrales is a a(n)[AM.3 80year old[AM.2] female with PMH as below who was brought to the E[AM.1]R[AM.3] after being found unresponsive  Diagnosed with Right cerebellar subacute stroke  CT scan showed a 1.1 cm sellar mass with suprasellar ext •  Levothyroxine Sodium tab 137 mcg, 137 mcg, Oral, Daily  •  Warfarin Sodium (COUMADIN) tab 0.5 mg, 0.5 mg, Oral, Daily  •  ondansetron HCl (ZOFRAN) injection 4 mg, 4 mg, Intravenous, Q6H PRN[AM.2]    A comprehensive 10 point review of systems was complet Cortisol[AM.1]: slightly high but this could be stress related, will repeat as an outpatient[AM.3]  TSH[AM.1] : will check she is on LT4 137 mcg daily[AM.3]  Prolactin[AM.1]: normal[AM.3]  IGF-1[AM.1] : in process[AM.3]    Also recommend a pituitary MRI wi Long term (current) use of anticoagulants     Hypertension, benign     Primary cardiomyopathy (HCC)     Disorder of lipid metabolism     Age-related macular degeneration, dry     Atrial fibrillation, unspecified type ([address]     Pseudophakia of both eyes Consultations:[FG.1]   Neurology  Endocrinology[FG.3]    Procedures:[FG.1] N/Z[IJ.7]    Complications:[FG.1] n/a[FG. 3]    Discharge Condition: Good    Discharge Medications:[FG.1]      Discharge Medications      CHANGE how you take these medications Quantity: 8 capsule  Refills: 0     D 1000 25 MCG (1000 UT) Caps  Generic drug: cholecalciferol      Take 1,000 Units by mouth daily. Refills: 0     Dilt- MG Cp24  Generic drug: dilTIAZem HCl ER      Take 180 mg by mouth daily.    Refills: 0     Es Follow up Labs:[FG.1] cortisol level  F/u IGF-4 level pending on dc[FG.3]     Other Discharge Instructions:[FG.1] monitor inr at rehab[FG.3]    Surya Shi MD  12/17/2020  8:11 AM    > 35 min[FG.1]         Electronically signed by Lynn Morales MD o Gait: Pt ambulated two bouts of 15' with RW and minAx1, chair follow for safety. Pt ambulates with shuffled gait pattern, forward flexed posture, and crouched gait pattern. Vitals: /57 in sitting at EOB.      Discussed discharge recommendation wit -   Sitting down on and standing up from a chair with arms (e.g., wheelchair, bedside commode, etc.): A Lot   -   Moving from lying on back to sitting on the side of the bed?: A Lot[LD. 2]   How much help from another person does the patient currently need. Attribution Key    LD. 1 - Betsy Calhoun, PT on 12/16/2020  9:16 AM  LD. 2 - Betsy Calhoun PT on 12/16/2020  9:17 AM               Physical Therapy Note signed by Betsy Calhoun PT at 12/15/2020 11:18 AM  Version 1 of 1    Author: Betsy Calhoun PT Service: Pt demonstrated marching in place with RW 10 times with minAx1 to practice dynamic standing balance.       Discussed discharge recommendation with patient, PT recommending sub acute rehab, will continue to communicate with Son, due to pt being a poor histor Princess Brock is a(n) 80year old female with a PMH of CHF, HTN, recent hospital stay with cervical fracture who presents with an episode of unresponsiveness. She arrived from home.   Per caregiver/medical records the patient was sitting in her chair an · Following Commands:  follows one step commands with increased time and follows one step commands with repetition  · Safety Judgement:  decreased awareness of need for assistance and decreased awareness of need for safety  · Problem Solving:  assistance r Don/Doff ortho/prosthesis  Energy conservation  Functional activity tolerated  Gait training  Neuromuscular re-educate  Posture  ROM  Strengthening  Lower therapeutic exercise:   Alternating marching  Ankle pumps  Transfer training[LD.1]    Patient End of S Related Notes: Original Note by Meryl Choi OT (Occupational Therapist) filed at 12/15/2020  1:54 PM       OCCUPATIONAL THERAPY EVALUATION - INPATIENT     Room Number: 373/741-D  Evaluation Date: 12/15/2020  Type of Evaluation: Initial  Presenting In this OT evaluation patient presents with the following impairments: cognition, strength and activity tolerance, balance. These deficits manifest functionally while performing *ADls and functional xfers.    The patient is below baseline and would benefi Prior Level of Colbert: Per chart, pt has a P/T CG, as attending Adult Day Care and had support from son during the evening/overnight and on weekends. Unclear when pt was last able to attend Day Care.      Pt was requiring assist for ADls noting that s Patient End of Session: Up in chair;Needs met;Call light within reach; Alarm set(MD aware of discharge recommendations)    OT Goals  Patients self stated goal is: go home with her son     Patient will complete functional transfer with CGA  Comment:     Thalia Pt is awake, O to person. Requires cues to provide her full birth date. Pt unable to provide time, place, or situation. Pt is able to follow simple one step commands, benefiting from repeated verbal and tactile cues.    Pt perseverates on off topic comment • Congestive heart disease (HCC)    • Congestive heart failure (HCC)    • Disorder of thyroid    • Thyroid disease        Past Surgical History  Past Surgical History:   Procedure Laterality Date   • CATARACT EXTRACTION W/  INTRAOCULAR LENS IMPLANT Right 4 -   Bathing (including washing, rinsing, drying)?: A Lot  -   Toileting, which includes using toilet, bedpan or urinal? : A Lot  -   Putting on and taking off regular upper body clothing?: A Lot  -   Taking care of personal grooming such as brushing teeth? Orders received, chart reviewed, clinical bedside swallow evaluation complete. Patient admitted s/p episode of unresponsiveness, +UTI; received alert and upright in bed, C-collar donned, afebrile, grossly oriented x2, in NAD.  Oral motor mechanism examinati CONCLUSION:   1. Negative for radiographically evident acute intrathoracic process. 2. Stable elevation of the left hemidiaphragm. 3. Stable moderate cardiomegaly. 4. Left chest wall AICD. [address] [place] [address] 12/13/20:CONCLUSION:   1.  No acute intracranial process b Electronically signed by MARYANN Luke on 12/14/2020 11:31 AM   Attribution Ellis    SJ.1 - MARYANN Luke on 12/14/2020 11:26 AM  SJ.2 - MARYANN Luke on 12/14/2020 11:27 AM                     Future Appointments        Provider Department [place]

## (undated) NOTE — IP AVS SNAPSHOT
[facility]            (For Outpatient Use Only) Initial Admit Date: 12/13/2020   Inpt/Obs Admit Date: Inpt: 12/13/20 / Obs: N/A   Discharge Date:    Lou Riggs:  [de-identified]   MRN: [de-identified]   CSN: [id number]   CEID: [id number]        E Subscriber Name:  Murleni Bill :    Subscriber ID:  Pt Rel to Subscriber:    Hospital Account Financial Class: Medicare Advantage    2020

## (undated) NOTE — IP AVS SNAPSHOT
[facility]            (For Outpatient Use Only) Initial Admit Date: 11/6/2020   Inpt/Obs Admit Date: Inpt: N/A / Obs: 11/07/20   Discharge Date:    Devon Bryant:  [de-identified]   MRN: [de-identified]   CSN: [id number]   CEID: [id number]        EN Subscriber Name:  Richa Marcelino :    Subscriber ID:  Pt Rel to Subscriber:    Hospital Account Financial Class: Medicare Advantage    2020

## (undated) NOTE — IP AVS SNAPSHOT
Patient Demographics     Address  [address] [place] [address] Phone  [phone] (Home) *Preferred*      Emergency Contact(s)     Name Relation Home Work [address] [place] [address] Son [phone]        Allergies as of 2/3/2021  Review status se Take 15 mL (10 g total) by mouth 2 (two) times daily as needed (constipation, severe).   What changed:   · when to take this  · reasons to take this        CONTINUE taking these medications    amiodarone HCl 200 MG Tabs  Commonly known as: PACERONE     amox Instructions Authorizing Provider Morning Afternoon Evening As Needed   acetaminophen 500 MG Tabs  Commonly known as: TYLENOL EXTRA STRENGTH      Take 500 mg by mouth TID & HS.          ALPRAZolam 0.25 MG Tabs  Commonly known as: XANAX      Take 1 tablet Commonly known as: METROGEL      Apply 1 Application topically daily as needed. To cheeks for rosacea          Miconazole Nitrate 2 % Powd  Next dose due: Tomorrow am      Apply topically daily.    Nayan Diaz MD         ondansetron 4 MG Tbdp  Commonly known [id number] carvedilol (COREG) tab 12.5 mg 02/02/21 1720 Given      [id number] carvedilol (COREG) tab 12.5 mg 02/03/21 0856 Given      [id number] cefTRIAXone Sodium (ROCEPHIN) 1 g in sodium chloride 0.9% 100 mL MBP/ADD-vantage 02/03/21 [address] [place] [address] [facility]) [facility] ([facility]) Devon Hubbard. Krystin Ingram M.D. [facility]  [facility] Zannie Cogan [address] 03/19/20 1442 - Present            Microbiology Results (All)     Procedure Component Value Units Date/Time    Urine Cult HISTORY OF PRESENT ILLNESS:  The patient is a 51-year-old  female with recent fall and odontoid C2 fracture, currently wearing C-collar, and being managed by Dr. Jeanie Barrow as an outpatient. Today she was noted by her family to be confused.   Brought int SOCIAL HISTORY:  No tobacco, alcohol, or drug use. Currently resides with her son. Since her fall and odontoid fracture, she has been bedbound. She has been developing pressure ulcers on both heels being followed by her son.   The patient is dependent in Attribution Ellis    FB. 1 - Tiffanie Ortiz MD on 1/29/2021  2:49 PM                        Discharge Summary - D/C Summary      Discharge Summary signed by Otoniel Rain MD at 2/3/2021 10:33 AM  Version 1 of 1    Author: Otoniel Rain Pt is received in the bed and was cleared for therapy session. RUTH Langford was present and assisted with the treatment session. PPE's donned by therapist of gloves,mask and goggles. Pt also donned mask throughout the session.  Pt with some confusion but able to AM-PAC '6-Clicks' INPATIENT SHORT FORM - BASIC MOBILITY  How much difficulty does the patient currently have. ..  -   Turning over in bed (including adjusting bedclothes, sheets and blankets)?: A Lot   -   Sitting down on and standing up from a chair with a Goal #5 Patient to demonstrate independence with home activity/exercise instructions provided to patient in preparation for discharge. Goal #5   Current Status IN PROGRESS   Goal #6    Goal #6  Current Status[RM. 1]           Attribution Key    RM. 1 - Mos

## (undated) NOTE — IP AVS SNAPSHOT
Patient Demographics     Address  [address] [place] [address] Phone  [phone] (Home) *Preferred*      Emergency Contact(s)     Name Relation Home Work Ariane Son [phone]       Allergies as of 11/9/2020  Rev Take 180 mg by mouth daily. Estradiol 0.1 MG/GM Crea  Commonly known as: ESTRACE  Next dose due: Resume home regimen      Place 1 gram vaginally twice a week          Fenofibrate 48 MG Tabs  Commonly known as: TRICOR  Next dose due:  Tomorrow 9 am Next dose due: Tonight 9 pm  Notes to patient: 11/9/2020 INR 2.38      Take as directed. If you are unsure how to take this medication, talk to your nurse or doctor. Original instructions: Take 0.5 mg by mouth.  0.5mg M/W/F/Sa/Aguero          Warfarin Sodium 1 [id number] fenofibrate micronized (LOFIBRA) cap 67 mg 11/09/20 0808 Given      [id number] lactulose (CHRONULAC) 10 GM/15ML solution 20 g 11/09/20 0802 Given              Recent Vital Signs       Most Recent Value   Vitals  138/51 Filed at 11/09/2020 1138 Rapid SARS-CoV-2 by PCR STAT [[id number]]  (Normal) Collected: 11/06/20 6548    Order Status: Completed Lab Status: Final result Updated: 11/07/20 0027    Specimen: Other from Nares      Rapid SARS-CoV-2 by PCR Not Detected         H&P - H&P Note      H&P QUF-QSTX general anesthesia, Shugarcaine, and Vision Blue   • CATARACT EXTRACTION W/  INTRAOCULAR LENS IMPLANT Left unknown year    unknown physician     Family History   Problem Relation Age of Onset   • Macular degeneration Sister    • Diabetes Neg    • Sig: TAKE 1 TABLET BY MOUTH ON SUNDAY, TUESDAY, THURSDAY, AND SATURDAY.  TAKE 2 TABLETS BY MOUTH ON MONDAY WEDNESDAY, AND FRIDAY   hydrocortisone 1 % External Cream   Yes No   Sig: APPLY BID TO BUTTOCKS   influenza Vac Split High-Dose 0.5 ML Intramuscular S ALKPHO 82 11/06/2020    BILT 0.7 11/06/2020    TP 7.1 11/06/2020    AST 71 11/06/2020    ALT 45 11/06/2020    PTT 31.6 11/06/2020    INR 2.22 11/06/2020    LIP 41 11/06/2020       Imaging:  Ct Brain Or Head (11065)    Result Date: 11/6/2020  CONCLUSION: CONCLUSION:  1. Moderate stool throughout the colon consistent with constipation/fecal retention. 2. Fluid-filled distended jejunum with normal caliber ileum. Findings may reflect regional small-bowel enteropathy versus small bowel ileus.   Partial small b Consults - MD Consult Notes      Consults signed by Beck Hairston MD at 11/7/2020  9:09 AM     Author: Beck Hairston MD Service: Neurosurgery Author Type: Physician    Filed: 11/7/2020  9:09 AM Date of Service: 11/7/2020  8:57 AM Status: Signed    :  Hal Watt • CATARACT EXTRACTION W/  INTRAOCULAR LENS IMPLANT Left unknown year    unknown physician     Family History   Problem Relation Age of Onset   • Macular degeneration Sister    • Diabetes Neg    • Glaucoma Neg       reports that she has never smoked.  She ha Physical Exam:  Temp:  [97.5 °F (36.4 °C)-98.6 °F (37 °C)] 97.5 °F (36.4 °C)  Pulse:  [59-82] 82  Resp:  [15-18] 16  BP: (117-155)/(60-96) 138/60  I/O last 3 completed shifts:  In: -   Out: 200 [Urine:200]      Neurological Exam:  Eyes open, regard examine CONCLUSION:  1. Acute comminuted type III odontoid fracture extending to the lateral mass, pedicle, and lamina of C2 with resultant impingement of the right transverse foramen.  This raises the possibility of attendant arterial injury of the V3 segment of t CONCLUSION:  1. There is a type III odontoid fracture of the C2 vertebral segment with extension into the lateral masses and displaced fracture fragments resulting in compression of the V3 segment of the right vertebral artery.  There may be associated blun 1. Odontoid fracture: Given her age, patient is not surgical. Recommend 3 months of Aspen collar at ALL times with a subsequent CT of c-spine and f/u with me after imaging. Imaging script in chart.  I have discussed with the nurse that PT should provide a Presenting Problem: fall from w/c into brick wall; C1-2 acute comminuted type III odontooid fx w/impingement on R transvere foramen[MJ. 2]  Reason for Therapy: Mobility Dysfunction and Discharge Planning    PHYSICAL THERAPY ASSESSMENT     Patient is a[MJ.1] RN Ender Ospina approves participation in therapy session. Seen in coordination with OT Gloria, masks, goggles, gloves worn throughout session. Patient presents sleeping lightly in bed but anxious to get up and agreeable with therapy.  She reports feeling well toda History related to current admission: fall out of w/c and head struck a brick wall     Problem List[MJ.1]  Principal Problem:    Blunt head injury, initial encounter  Active Problems:    Acute intractable headache, unspecified headache type    Intractable Lower extremity strength is within functional limits     BALANCE[MJ.1]  Static Sitting: Fair  Dynamic Sitting: Fair -  Static Standing: Fair -  Dynamic Standing: Poor +[MJ.2]    ADDITIONAL TESTS                                    NEUROLOGICAL FINDINGS[MJ. 1 Comment : Patient ambulates with Rw with fair stability and gloria. She is able to step over a small object on floor with min A and RW as well. Did not attempt stairs, unclear if there are any at home.  Pt has enough strength to do stairs with assist from Filed: 11/7/2020  8:28 AM Date of Service: 11/7/2020  8:26 AM Status: Signed    : Dolly Olson PT (Physical Therapist)       PT evaluation orders received and chart reviewed.    CT c-spine/brain imaging significant for:  Acute comminuted type II Patient is a 80year old female admitted 11/6/2020 for fall at adult  resulting in an odontoid fracture with fragments against her vertebral artery. Per neurosurgery, plan is for hard Aspen collar for 12 weeks.   In this OT evaluation patient present General Observations: The patient's Approx Degree of Impairment: 66.57% has been calculated based on documentation in the [facility] '6 clicks' Inpatient Daily Activity Short Form.   Research supports that patients with this level of impairment may benefit from Patient is pleasantly confused     OCCUPATIONAL THERAPY EXAMINATION      OBJECTIVE  Precautions: Cervical brace;Bed/chair alarm(Aspen collar AT ALL TIMES; )  Fall Risk: High fall risk    PAIN ASSESSMENT  Rating: Unable to rate  Location: (neck)       ACTIV Upper Extremity Dressing: mod a   Lower Extremity Dressing: max a     Patient End of Session: Up in chair;Needs met;Call light within reach;RN aware of session/findings;Bracing education provided; All patient questions and concerns addressed; Alarm set    OT

## (undated) NOTE — LETTER
Hospital Discharge Documentation  Please phone to schedule a hospital follow up appointment. No discharge summary available at this time, below is the most recent progress note  for your review .         From: [address] [facility]'s Office  Phone: [phone] Blood pressure 137/70, pulse 68, temperature 97.7 °F (36.5 °C), temperature source Oral, resp.  rate 20, height 4' 11\" (1.499 m), weight 134 lb 1.6 oz (60.8 kg), SpO2 96 %, not currently breastfeeding.     General appearance: alert, appears stated age and   MG 2.0 01/29/2021     TROP <0.045 12/13/2020         No results found.         Results:      CBC:          Lab Results   Component Value Date     WBC 5.8 02/02/2021     WBC 5.3 02/01/2021     WBC 5.9 01/31/2021            Lab Results   Component Value Da Electronically signed by Vinita Null DO at 2/2/2021  2:08 PM

## (undated) NOTE — LETTER
Hospital Discharge Documentation  Patient was discharged to [facility]  [phone]. No discharge summary available at this time, below is the most recent progress note  for your review .       From: [address] [facility] Blood pressure 137/70, pulse 68, temperature 97.7 °F (36.5 °C), temperature source Oral, resp.  rate 20, height 4' 11\" (1.499 m), weight 134 lb 1.6 oz (60.8 kg), SpO2 96 %, not currently breastfeeding.     General appearance: alert, appears stated age and   MG 2.0 01/29/2021     TROP <0.045 12/13/2020         No results found.         Results:      CBC:          Lab Results   Component Value Date     WBC 5.8 02/02/2021     WBC 5.3 02/01/2021     WBC 5.9 01/31/2021            Lab Results   Component Value Da Electronically signed by Marta Charles DO at 2/2/2021  2:08 PM

## (undated) NOTE — IP AVS SNAPSHOT
[facility]            (For Outpatient Use Only) Initial Admit Date: 1/29/2021   Inpt/Obs Admit Date: Inpt: 1/29/21 / Obs: 02/02/21   Discharge Date:    Zak Proper:  [de-identified]   MRN: [de-identified]   CSN: [id number]   CEID: [id number] Subscriber Name:  Addison Hargrove :    Subscriber ID:  Pt Rel to Subscriber:    Hospital Account Financial Class: Medicare Advantage    February 3, 2021

## (undated) NOTE — LETTER
9/25/2017              [address] [id number]         [facility],    [facility] records indicate that the tests ordered for you by Radha Rick MD  have not been done.   If you have, in fact, already completed the t

## (undated) NOTE — LETTER
Hospital Discharge Documentation  Patient was discharged to [facility] at [phone].     From: [address] Hospitalist's Office  Phone: [phone]    Patient discharged time/date: 11/9/2020  3:49 PM  Patient discharge disposition:  SNF Type III odontoid fracture of C2 vertebrae with associated compression of right vertebral artery 2/2 Fall  -NS on consult  -no surgery recommended  -con't C-collar  -PT/OT  -f/u imaging in 3 months  -discussed vertebral artery compression with neurointerve Commonly known as: ZOFRAN-ODT      Take 1 tablet (4 mg total) by mouth every 8 (eight) hours as needed.    Stop taking on: November 13, 2020  Quantity: 30 tablet  Refills: 0        CHANGE how you take these medications      Instructions Prescription details Refills: 0     furosemide 20 MG Tabs  Commonly known as: LASIX      TAKE 1 TABLET BY MOUTH ON SUNDAY, TUESDAY, THURSDAY, AND SATURDAY.  TAKE 2 TABLETS BY MOUTH ON MONDAY WEDNESDAY, AND FRIDAY   Quantity: 120 tablet  Refills: 1     influenza Vac Split High- JH.2 - Grace Menchaca MD on 11/9/2020  2:29 PM

## (undated) NOTE — LETTER
Hospital Discharge Documentation  Patient was discharged to [facility] [phone]    From: [address] Hospitalist's Office  Phone: [phone]    Patient discharged time/date: 12/17/2020  2:05 PM  Patient discharge disposition:  SNF Acute intractable headache, unspecified headache type     Intractable nausea and vomiting     Episode of unresponsiveness     Acute cystitis without hematuria[FG.2]      Reason for NEXEGMAUU:[JL.3]   Acute Metabolic Encephalopathy  Type III odontoid fra Type III odontoid fracture of C2 vertebrae with associated compression of right vertebral artery 2/2 Fall  -was seen by NS  -no surgery recommended  -con't C-collar  -PT/OT  -f/u imaging in 3 months, f/u NS as OP  -discussed vertebral artery compression wi Take 500 mg by mouth TID & HS. Refills: 0     acetaminophen 325 MG Tabs  Commonly known as: TYLENOL      Take 2 tablets (650 mg total) by mouth every 6 (six) hours as needed.    Quantity:    Refills: 0     ALPRAZolam 0.25 MG Tabs  Commonly known as: Yissel Speed Generic drug: Calcium Carbonate-Vitamin D      Take 1 tablet by mouth 2 (two) times daily.    Refills: 0        STOP taking these medications    influenza Vac Split High-Dose 0.5 ML Barb  Commonly known as: FLUZONE HD        Polyethylene Glycol 3350 17 GM/S

## (undated) NOTE — LETTER
June 26, 2017    [address] [facility] [address]      Dear Raghavendra Root: The following are the results of your recent tests. Please review the list of test results.   Your result is the value on the left; we have also supplied the range o